# Patient Record
Sex: FEMALE | Race: WHITE | Employment: FULL TIME | URBAN - METROPOLITAN AREA
[De-identification: names, ages, dates, MRNs, and addresses within clinical notes are randomized per-mention and may not be internally consistent; named-entity substitution may affect disease eponyms.]

---

## 2017-02-28 ENCOUNTER — APPOINTMENT (OUTPATIENT)
Dept: LAB | Age: 27
End: 2017-02-28
Attending: OBSTETRICS & GYNECOLOGY
Payer: COMMERCIAL

## 2017-02-28 ENCOUNTER — TELEPHONE (OUTPATIENT)
Dept: OBGYN CLINIC | Facility: CLINIC | Age: 27
End: 2017-02-28

## 2017-02-28 DIAGNOSIS — N91.2 AMENORRHEA: Primary | ICD-10-CM

## 2017-02-28 DIAGNOSIS — N91.2 AMENORRHEA: ICD-10-CM

## 2017-02-28 LAB
HCG QUANTITATIVE: 18 MIU/ML (ref ?–1)
PROGESTERONE: 26.02 NG/ML

## 2017-02-28 PROCEDURE — 84144 ASSAY OF PROGESTERONE: CPT

## 2017-02-28 PROCEDURE — 84702 CHORIONIC GONADOTROPIN TEST: CPT

## 2017-02-28 NOTE — TELEPHONE ENCOUNTER
pt has had period for 3 months and is irregular and just took a positive preg test and wants to know what to do

## 2017-02-28 NOTE — TELEPHONE ENCOUNTER
Patient's last LMP February 1, 2017, irregular menses, positive urine pregnancy test this morning. Order for HCG/progestrone.

## 2017-03-01 ENCOUNTER — TELEPHONE (OUTPATIENT)
Dept: OBGYN CLINIC | Facility: CLINIC | Age: 27
End: 2017-03-01

## 2017-03-01 DIAGNOSIS — N91.2 AMENORRHEA, UNSPECIFIED: Primary | ICD-10-CM

## 2017-03-02 ENCOUNTER — APPOINTMENT (OUTPATIENT)
Dept: LAB | Age: 27
End: 2017-03-02
Attending: OBSTETRICS & GYNECOLOGY
Payer: COMMERCIAL

## 2017-03-02 DIAGNOSIS — N91.2 AMENORRHEA, UNSPECIFIED: ICD-10-CM

## 2017-03-02 LAB — HCG QUANTITATIVE: 55 MIU/ML (ref ?–1)

## 2017-03-02 PROCEDURE — 84702 CHORIONIC GONADOTROPIN TEST: CPT

## 2017-03-02 NOTE — PROGRESS NOTES
Quick Note:    Patient aware, appointments schedule as instructed per MD. Patient instructed to start taking prenatal vitamins  ______

## 2017-03-16 ENCOUNTER — TELEPHONE (OUTPATIENT)
Dept: OBGYN CLINIC | Facility: CLINIC | Age: 27
End: 2017-03-16

## 2017-03-16 NOTE — TELEPHONE ENCOUNTER
Patient called with a c/o abdominal cramping/ no bleeding. Patient is 6 weeks pregnant. Appointment to see Dr. Marixa Jacobs schedule.

## 2017-03-17 ENCOUNTER — LAB ENCOUNTER (OUTPATIENT)
Dept: LAB | Age: 27
End: 2017-03-17
Attending: OBSTETRICS & GYNECOLOGY
Payer: COMMERCIAL

## 2017-03-17 ENCOUNTER — NURSE ONLY (OUTPATIENT)
Dept: OBGYN CLINIC | Facility: CLINIC | Age: 27
End: 2017-03-17

## 2017-03-17 ENCOUNTER — OFFICE VISIT (OUTPATIENT)
Dept: OBGYN CLINIC | Facility: CLINIC | Age: 27
End: 2017-03-17

## 2017-03-17 VITALS
BODY MASS INDEX: 23.74 KG/M2 | SYSTOLIC BLOOD PRESSURE: 118 MMHG | TEMPERATURE: 99 F | DIASTOLIC BLOOD PRESSURE: 60 MMHG | HEART RATE: 96 BPM | HEIGHT: 63 IN | WEIGHT: 134 LBS

## 2017-03-17 DIAGNOSIS — Z3A.01 LESS THAN 8 WEEKS GESTATION OF PREGNANCY: ICD-10-CM

## 2017-03-17 DIAGNOSIS — Z34.81 PRENATAL CARE, SUBSEQUENT PREGNANCY, FIRST TRIMESTER: Primary | ICD-10-CM

## 2017-03-17 LAB
ANTIBODY SCREEN: NEGATIVE
APPEARANCE: CLEAR
BASOPHILS # BLD AUTO: 0.05 X10(3) UL (ref 0–0.1)
BASOPHILS NFR BLD AUTO: 0.7 %
EOSINOPHIL # BLD AUTO: 0.05 X10(3) UL (ref 0–0.3)
EOSINOPHIL NFR BLD AUTO: 0.7 %
ERYTHROCYTE [DISTWIDTH] IN BLOOD BY AUTOMATED COUNT: 12.6 % (ref 11.5–16)
HBV SURFACE AG SERPL QL IA: NONREACTIVE
HCG QUANTITATIVE: ABNORMAL MIU/ML (ref ?–1)
HCT VFR BLD AUTO: 39.6 % (ref 34–50)
HGB BLD-MCNC: 13.5 G/DL (ref 12–16)
IMMATURE GRANULOCYTE COUNT: 0.04 X10(3) UL (ref 0–1)
IMMATURE GRANULOCYTE RATIO %: 0.6 %
LYMPHOCYTES # BLD AUTO: 1.37 X10(3) UL (ref 0.9–4)
LYMPHOCYTES NFR BLD AUTO: 20.1 %
MCH RBC QN AUTO: 31.2 PG (ref 27–33.2)
MCHC RBC AUTO-ENTMCNC: 34.1 G/DL (ref 31–37)
MCV RBC AUTO: 91.5 FL (ref 81–100)
MONOCYTES # BLD AUTO: 0.51 X10(3) UL (ref 0.1–0.6)
MONOCYTES NFR BLD AUTO: 7.5 %
MULTISTIX LOT#: NORMAL NUMERIC
NEUTROPHIL ABS PRELIM: 4.8 X10 (3) UL (ref 1.3–6.7)
NEUTROPHILS # BLD AUTO: 4.8 X10(3) UL (ref 1.3–6.7)
NEUTROPHILS NFR BLD AUTO: 70.4 %
PH, URINE: 5.5 (ref 4.5–8)
PLATELET # BLD AUTO: 280 10(3)UL (ref 150–450)
RBC # BLD AUTO: 4.33 X10(6)UL (ref 3.8–5.1)
RED CELL DISTRIBUTION WIDTH-SD: 41.8 FL (ref 35.1–46.3)
RH BLOOD TYPE: POSITIVE
RUBELLA IGG QUANTITATIVE: 63.6 IU/ML
RUBV IGG SER QL: POSITIVE
SPECIFIC GRAVITY: 1.02 (ref 1–1.03)
T PALLIDUM AB SER QL IA: NONREACTIVE
URINE-COLOR: YELLOW
UROBILINOGEN,SEMI-QN: 0.2 MG/DL (ref 0–1.9)
WBC # BLD AUTO: 6.8 X10(3) UL (ref 4–13)

## 2017-03-17 PROCEDURE — 86901 BLOOD TYPING SEROLOGIC RH(D): CPT

## 2017-03-17 PROCEDURE — 86900 BLOOD TYPING SEROLOGIC ABO: CPT

## 2017-03-17 PROCEDURE — 81002 URINALYSIS NONAUTO W/O SCOPE: CPT | Performed by: OBSTETRICS & GYNECOLOGY

## 2017-03-17 PROCEDURE — 87660 TRICHOMONAS VAGIN DIR PROBE: CPT | Performed by: OBSTETRICS & GYNECOLOGY

## 2017-03-17 PROCEDURE — 87480 CANDIDA DNA DIR PROBE: CPT | Performed by: OBSTETRICS & GYNECOLOGY

## 2017-03-17 PROCEDURE — 87340 HEPATITIS B SURFACE AG IA: CPT

## 2017-03-17 PROCEDURE — 84702 CHORIONIC GONADOTROPIN TEST: CPT

## 2017-03-17 PROCEDURE — 88175 CYTOPATH C/V AUTO FLUID REDO: CPT | Performed by: OBSTETRICS & GYNECOLOGY

## 2017-03-17 PROCEDURE — 87624 HPV HI-RISK TYP POOLED RSLT: CPT | Performed by: OBSTETRICS & GYNECOLOGY

## 2017-03-17 PROCEDURE — 99395 PREV VISIT EST AGE 18-39: CPT | Performed by: OBSTETRICS & GYNECOLOGY

## 2017-03-17 PROCEDURE — 85025 COMPLETE CBC W/AUTO DIFF WBC: CPT

## 2017-03-17 PROCEDURE — 86762 RUBELLA ANTIBODY: CPT

## 2017-03-17 PROCEDURE — 87389 HIV-1 AG W/HIV-1&-2 AB AG IA: CPT

## 2017-03-17 PROCEDURE — 87086 URINE CULTURE/COLONY COUNT: CPT | Performed by: OBSTETRICS & GYNECOLOGY

## 2017-03-17 PROCEDURE — 76801 OB US < 14 WKS SINGLE FETUS: CPT | Performed by: OBSTETRICS & GYNECOLOGY

## 2017-03-17 PROCEDURE — 87510 GARDNER VAG DNA DIR PROBE: CPT | Performed by: OBSTETRICS & GYNECOLOGY

## 2017-03-17 PROCEDURE — 86780 TREPONEMA PALLIDUM: CPT

## 2017-03-17 PROCEDURE — 86850 RBC ANTIBODY SCREEN: CPT

## 2017-03-17 NOTE — PROGRESS NOTES
Marycruz Drummond is a 32year old female. HPI:   Patient presents with:  Prenatal Care: cramping abd area      C/O LOWER ABD PAIN<  WORSE IN AM WITH MOVEMENT. IRREG MENSES NO FEVER>   NO BLEEDING.  NO DYSURIA  OR  VAG SYX>  NO DYSPARUNIA    Medications (A

## 2017-03-19 LAB
C TRACH DNA SPEC QL NAA+PROBE: NEGATIVE
N GONORRHOEA DNA SPEC QL NAA+PROBE: NEGATIVE

## 2017-03-20 ENCOUNTER — TELEPHONE (OUTPATIENT)
Dept: OBGYN CLINIC | Facility: CLINIC | Age: 27
End: 2017-03-20

## 2017-03-20 NOTE — TELEPHONE ENCOUNTER
Per Dr. Florencia Daly patient to come back for [address] and 1st OB visit on 3/22/17.  Patient aware

## 2017-03-20 NOTE — TELEPHONE ENCOUNTER
Pt has questions reg her results that came in, that everything seemed normal but why did she get the pain on her side, and if she still needed her ultrasound appointment for 03-22-17

## 2017-03-21 LAB — HPV I/H RISK 1 DNA SPEC QL NAA+PROBE: NEGATIVE

## 2017-03-22 ENCOUNTER — NURSE ONLY (OUTPATIENT)
Dept: OBGYN CLINIC | Facility: CLINIC | Age: 27
End: 2017-03-22

## 2017-03-22 ENCOUNTER — OFFICE VISIT (OUTPATIENT)
Dept: OBGYN CLINIC | Facility: CLINIC | Age: 27
End: 2017-03-22

## 2017-03-22 VITALS
BODY MASS INDEX: 24.45 KG/M2 | WEIGHT: 138 LBS | DIASTOLIC BLOOD PRESSURE: 60 MMHG | HEIGHT: 63 IN | SYSTOLIC BLOOD PRESSURE: 128 MMHG

## 2017-03-22 DIAGNOSIS — Z3A.01 LESS THAN 8 WEEKS GESTATION OF PREGNANCY: ICD-10-CM

## 2017-03-22 DIAGNOSIS — Z34.81 PRENATAL CARE, SUBSEQUENT PREGNANCY, FIRST TRIMESTER: Primary | ICD-10-CM

## 2017-03-23 DIAGNOSIS — Z34.91 ENCOUNTER FOR PREGNANCY RELATED EXAMINATION IN FIRST TRIMESTER: Primary | ICD-10-CM

## 2017-03-23 PROCEDURE — 76801 OB US < 14 WKS SINGLE FETUS: CPT | Performed by: OBSTETRICS & GYNECOLOGY

## 2017-03-23 PROCEDURE — 76817 TRANSVAGINAL US OBSTETRIC: CPT | Performed by: OBSTETRICS & GYNECOLOGY

## 2017-03-24 ENCOUNTER — TELEPHONE (OUTPATIENT)
Dept: OBGYN CLINIC | Facility: CLINIC | Age: 27
End: 2017-03-24

## 2017-03-24 RX ORDER — DOXYLAMINE SUCCINATE AND PYRIDOXINE HYDROCHLORIDE, DELAYED RELEASE TABLETS 10 MG/10 MG 10; 10 MG/1; MG/1
2 TABLET, DELAYED RELEASE ORAL NIGHTLY
Qty: 120 TABLET | Refills: 1 | Status: SHIPPED | OUTPATIENT
Start: 2017-03-24 | End: 2017-07-28

## 2017-03-24 NOTE — TELEPHONE ENCOUNTER
----- Message from Veronica Gutierrez sent at 3/24/2017 12:42 PM CDT -----  Contact: self  Pt calling back

## 2017-04-19 ENCOUNTER — APPOINTMENT (OUTPATIENT)
Dept: LAB | Age: 27
End: 2017-04-19
Attending: OBSTETRICS & GYNECOLOGY
Payer: COMMERCIAL

## 2017-04-19 ENCOUNTER — OFFICE VISIT (OUTPATIENT)
Dept: OBGYN CLINIC | Facility: CLINIC | Age: 27
End: 2017-04-19

## 2017-04-19 ENCOUNTER — NURSE ONLY (OUTPATIENT)
Dept: OBGYN CLINIC | Facility: CLINIC | Age: 27
End: 2017-04-19

## 2017-04-19 VITALS
SYSTOLIC BLOOD PRESSURE: 102 MMHG | HEIGHT: 63 IN | WEIGHT: 133 LBS | BODY MASS INDEX: 23.57 KG/M2 | DIASTOLIC BLOOD PRESSURE: 60 MMHG

## 2017-04-19 DIAGNOSIS — Z13.79 ENCOUNTER FOR GENETIC SCREENING: ICD-10-CM

## 2017-04-19 DIAGNOSIS — Z3A.10 10 WEEKS GESTATION OF PREGNANCY: ICD-10-CM

## 2017-04-19 DIAGNOSIS — Z34.81 PRENATAL CARE, SUBSEQUENT PREGNANCY, FIRST TRIMESTER: Primary | ICD-10-CM

## 2017-04-19 PROCEDURE — 76801 OB US < 14 WKS SINGLE FETUS: CPT | Performed by: OBSTETRICS & GYNECOLOGY

## 2017-04-19 NOTE — PROGRESS NOTES
C/o nausea, takes diclegis nightly, advised to add dose in a.m.  - N.T.-1.5cm-NL, counsyl today  - AFP at 16-18 weeks

## 2017-04-28 NOTE — PROGRESS NOTES
Patient aware of normal Counsyl results, predicted fetal sex-Female (patient does not want to know the sex at this time)

## 2017-05-02 ENCOUNTER — MED REC SCAN ONLY (OUTPATIENT)
Dept: OBGYN CLINIC | Facility: CLINIC | Age: 27
End: 2017-05-02

## 2017-05-17 ENCOUNTER — OFFICE VISIT (OUTPATIENT)
Dept: OBGYN CLINIC | Facility: CLINIC | Age: 27
End: 2017-05-17

## 2017-05-17 VITALS
SYSTOLIC BLOOD PRESSURE: 110 MMHG | WEIGHT: 132 LBS | HEIGHT: 63 IN | DIASTOLIC BLOOD PRESSURE: 68 MMHG | BODY MASS INDEX: 23.39 KG/M2

## 2017-05-17 DIAGNOSIS — Z3A.14 14 WEEKS GESTATION OF PREGNANCY: ICD-10-CM

## 2017-05-17 DIAGNOSIS — Z34.82 PRENATAL CARE, SUBSEQUENT PREGNANCY, SECOND TRIMESTER: Primary | ICD-10-CM

## 2017-06-14 ENCOUNTER — APPOINTMENT (OUTPATIENT)
Dept: LAB | Age: 27
End: 2017-06-14
Attending: OBSTETRICS & GYNECOLOGY
Payer: COMMERCIAL

## 2017-06-14 ENCOUNTER — OFFICE VISIT (OUTPATIENT)
Dept: OBGYN CLINIC | Facility: CLINIC | Age: 27
End: 2017-06-14

## 2017-06-14 VITALS
WEIGHT: 136 LBS | HEIGHT: 63 IN | DIASTOLIC BLOOD PRESSURE: 72 MMHG | BODY MASS INDEX: 24.1 KG/M2 | SYSTOLIC BLOOD PRESSURE: 112 MMHG

## 2017-06-14 DIAGNOSIS — Z3A.18 18 WEEKS GESTATION OF PREGNANCY: ICD-10-CM

## 2017-06-14 DIAGNOSIS — IMO0002 EVALUATE ANATOMY NOT SEEN ON PRIOR SONOGRAM: ICD-10-CM

## 2017-06-14 DIAGNOSIS — Z34.82 PRENATAL CARE, SUBSEQUENT PREGNANCY, SECOND TRIMESTER: ICD-10-CM

## 2017-06-14 DIAGNOSIS — Z34.82 PRENATAL CARE, SUBSEQUENT PREGNANCY, SECOND TRIMESTER: Primary | ICD-10-CM

## 2017-06-14 DIAGNOSIS — N76.0 VAGINITIS AND VULVOVAGINITIS: ICD-10-CM

## 2017-06-14 PROCEDURE — 87660 TRICHOMONAS VAGIN DIR PROBE: CPT | Performed by: OBSTETRICS & GYNECOLOGY

## 2017-06-14 PROCEDURE — 87510 GARDNER VAG DNA DIR PROBE: CPT | Performed by: OBSTETRICS & GYNECOLOGY

## 2017-06-14 PROCEDURE — 76805 OB US >/= 14 WKS SNGL FETUS: CPT | Performed by: OBSTETRICS & GYNECOLOGY

## 2017-06-14 PROCEDURE — 87480 CANDIDA DNA DIR PROBE: CPT | Performed by: OBSTETRICS & GYNECOLOGY

## 2017-06-14 PROCEDURE — 82105 ALPHA-FETOPROTEIN SERUM: CPT | Performed by: OBSTETRICS & GYNECOLOGY

## 2017-06-27 ENCOUNTER — HOSPITAL ENCOUNTER (EMERGENCY)
Facility: HOSPITAL | Age: 27
Discharge: HOME | End: 2017-06-27
Attending: EMERGENCY MEDICINE
Payer: COMMERCIAL

## 2017-06-27 ENCOUNTER — APPOINTMENT (OUTPATIENT)
Dept: GENERAL RADIOLOGY | Facility: HOSPITAL | Age: 27
End: 2017-06-27
Attending: EMERGENCY MEDICINE
Payer: COMMERCIAL

## 2017-06-27 ENCOUNTER — APPOINTMENT (OUTPATIENT)
Dept: ULTRASOUND IMAGING | Facility: HOSPITAL | Age: 27
End: 2017-06-27
Attending: EMERGENCY MEDICINE
Payer: COMMERCIAL

## 2017-06-27 ENCOUNTER — APPOINTMENT (OUTPATIENT)
Dept: CT IMAGING | Facility: HOSPITAL | Age: 27
End: 2017-06-27
Attending: EMERGENCY MEDICINE
Payer: COMMERCIAL

## 2017-06-27 ENCOUNTER — OFFICE VISIT (OUTPATIENT)
Dept: OBGYN CLINIC | Facility: CLINIC | Age: 27
End: 2017-06-27

## 2017-06-27 ENCOUNTER — TELEPHONE (OUTPATIENT)
Dept: OBGYN CLINIC | Facility: CLINIC | Age: 27
End: 2017-06-27

## 2017-06-27 VITALS
WEIGHT: 140 LBS | TEMPERATURE: 98 F | HEART RATE: 79 BPM | DIASTOLIC BLOOD PRESSURE: 77 MMHG | RESPIRATION RATE: 18 BRPM | BODY MASS INDEX: 25 KG/M2 | OXYGEN SATURATION: 99 % | SYSTOLIC BLOOD PRESSURE: 112 MMHG

## 2017-06-27 VITALS
SYSTOLIC BLOOD PRESSURE: 102 MMHG | WEIGHT: 139 LBS | DIASTOLIC BLOOD PRESSURE: 62 MMHG | HEIGHT: 63 IN | BODY MASS INDEX: 24.63 KG/M2

## 2017-06-27 DIAGNOSIS — Z34.82 PRENATAL CARE, SUBSEQUENT PREGNANCY, SECOND TRIMESTER: Primary | ICD-10-CM

## 2017-06-27 DIAGNOSIS — R07.9 ACUTE CHEST PAIN: Primary | ICD-10-CM

## 2017-06-27 PROCEDURE — 93010 ELECTROCARDIOGRAM REPORT: CPT

## 2017-06-27 PROCEDURE — 93005 ELECTROCARDIOGRAM TRACING: CPT

## 2017-06-27 PROCEDURE — 83880 ASSAY OF NATRIURETIC PEPTIDE: CPT | Performed by: EMERGENCY MEDICINE

## 2017-06-27 PROCEDURE — 71275 CT ANGIOGRAPHY CHEST: CPT | Performed by: EMERGENCY MEDICINE

## 2017-06-27 PROCEDURE — 85025 COMPLETE CBC W/AUTO DIFF WBC: CPT | Performed by: EMERGENCY MEDICINE

## 2017-06-27 PROCEDURE — 99285 EMERGENCY DEPT VISIT HI MDM: CPT

## 2017-06-27 PROCEDURE — 36415 COLL VENOUS BLD VENIPUNCTURE: CPT

## 2017-06-27 PROCEDURE — 80053 COMPREHEN METABOLIC PANEL: CPT | Performed by: EMERGENCY MEDICINE

## 2017-06-27 PROCEDURE — 71010 XR CHEST AP PORTABLE  (CPT=71010): CPT | Performed by: EMERGENCY MEDICINE

## 2017-06-27 PROCEDURE — 84484 ASSAY OF TROPONIN QUANT: CPT | Performed by: EMERGENCY MEDICINE

## 2017-06-27 PROCEDURE — 83690 ASSAY OF LIPASE: CPT | Performed by: EMERGENCY MEDICINE

## 2017-06-27 PROCEDURE — 93970 EXTREMITY STUDY: CPT | Performed by: EMERGENCY MEDICINE

## 2017-06-27 NOTE — ED PROVIDER NOTES
Patient Seen in: [facility] Emergency Department    History   Patient presents with:  Chest Pain Angina (cardiovascular)    Stated Complaint: cp     HPI    Patient is a 77-year-old with a history of childhood asthma who presents for evaluation of ch in HPI.     Physical Exam   ED Triage Vitals [06/27/17 1646]  BP: 118/73  Pulse: 82  Resp: 16  Temp: 97.8 °F (36.6 °C)  Temp src: n/a  SpO2: 100 %  O2 Device: None (Room air)    Current:/65   Pulse 84   Temp 97.8 °F (36.6 °C)   Resp 15   Wt 63.5 kg DRAW BLUE   RAINBOW DRAW LAVENDER   RAINBOW DRAW LIGHT GREEN   RAINBOW DRAW GOLD   RAINBOW DRAW GOLD   RAINBOW DRAW BLUE   RAINBOW DRAW LAVENDER   RAINBOW DRAW LIGHT GREEN     EKG    Rate, intervals and axes as noted on EKG Report.   Rate: 83  Rhythm: Sinus superficial femoral, popliteal, sapheno-femoral junction, and posterior tibial veins. FINDINGS:  Compression is demonstrated of the common femoral, superficial femoral and popliteal venous segments bilaterally.  There is observed phasicity and augmentatio evidence of an emergent condition which require further intervention or hospitalization I do feel she can be discharged home and is strongly encouraged follow-up with her primary care physician. I did speak with Dr. Mary Jose her OB who sent her into the ER.

## 2017-06-27 NOTE — PROGRESS NOTES
u/s views scheduled. Complaining of chest pain. Tightening. Some shortness of breath. History of heartburn does not feel like that. Not take anything for the pain. Chest pain spreads out to the side. No history of DVT or heart problems. Sent to ER.

## 2017-07-12 ENCOUNTER — OFFICE VISIT (OUTPATIENT)
Dept: OBGYN CLINIC | Facility: CLINIC | Age: 27
End: 2017-07-12

## 2017-07-12 VITALS
SYSTOLIC BLOOD PRESSURE: 110 MMHG | DIASTOLIC BLOOD PRESSURE: 58 MMHG | WEIGHT: 139 LBS | BODY MASS INDEX: 24.63 KG/M2 | HEIGHT: 63 IN

## 2017-07-12 DIAGNOSIS — IMO0002 EVALUATE ANATOMY NOT SEEN ON PRIOR SONOGRAM: ICD-10-CM

## 2017-07-12 DIAGNOSIS — Z34.82 PRENATAL CARE, SUBSEQUENT PREGNANCY, SECOND TRIMESTER: Primary | ICD-10-CM

## 2017-07-12 DIAGNOSIS — Z3A.22 22 WEEKS GESTATION OF PREGNANCY: ICD-10-CM

## 2017-07-12 PROCEDURE — 76816 OB US FOLLOW-UP PER FETUS: CPT | Performed by: OBSTETRICS & GYNECOLOGY

## 2017-07-28 RX ORDER — DOXYLAMINE SUCCINATE AND PYRIDOXINE HYDROCHLORIDE 10; 10 MG/1; MG/1
TABLET, DELAYED RELEASE ORAL
Qty: 120 TABLET | Refills: 0 | Status: ON HOLD | OUTPATIENT
Start: 2017-07-28 | End: 2017-11-02

## 2017-08-10 ENCOUNTER — OFFICE VISIT (OUTPATIENT)
Dept: OBGYN CLINIC | Facility: CLINIC | Age: 27
End: 2017-08-10

## 2017-08-10 ENCOUNTER — LAB ENCOUNTER (OUTPATIENT)
Dept: LAB | Age: 27
End: 2017-08-10
Attending: OBSTETRICS & GYNECOLOGY
Payer: COMMERCIAL

## 2017-08-10 VITALS
HEIGHT: 63 IN | SYSTOLIC BLOOD PRESSURE: 100 MMHG | DIASTOLIC BLOOD PRESSURE: 60 MMHG | BODY MASS INDEX: 25.52 KG/M2 | WEIGHT: 144 LBS

## 2017-08-10 DIAGNOSIS — Z34.82 PRENATAL CARE, SUBSEQUENT PREGNANCY, SECOND TRIMESTER: Primary | ICD-10-CM

## 2017-08-10 DIAGNOSIS — Z34.82 PRENATAL CARE, SUBSEQUENT PREGNANCY, SECOND TRIMESTER: ICD-10-CM

## 2017-08-10 LAB
BASOPHILS # BLD AUTO: 0.02 X10(3) UL (ref 0–0.1)
BASOPHILS NFR BLD AUTO: 0.2 %
EOSINOPHIL # BLD AUTO: 0.12 X10(3) UL (ref 0–0.3)
EOSINOPHIL NFR BLD AUTO: 1.4 %
ERYTHROCYTE [DISTWIDTH] IN BLOOD BY AUTOMATED COUNT: 13.2 % (ref 11.5–16)
GLUCOSE 1H P GLC SERPL-MCNC: 101 MG/DL
HCT VFR BLD AUTO: 28 % (ref 34–50)
HGB BLD-MCNC: 8.9 G/DL (ref 12–16)
IMMATURE GRANULOCYTE COUNT: 0.12 X10(3) UL (ref 0–1)
IMMATURE GRANULOCYTE RATIO %: 1.4 %
LYMPHOCYTES # BLD AUTO: 1.69 X10(3) UL (ref 0.9–4)
LYMPHOCYTES NFR BLD AUTO: 19.3 %
MCH RBC QN AUTO: 29.1 PG (ref 27–33.2)
MCHC RBC AUTO-ENTMCNC: 31.8 G/DL (ref 31–37)
MCV RBC AUTO: 91.5 FL (ref 81–100)
MONOCYTES # BLD AUTO: 0.65 X10(3) UL (ref 0.1–0.6)
MONOCYTES NFR BLD AUTO: 7.4 %
NEUTROPHIL ABS PRELIM: 6.14 X10 (3) UL (ref 1.3–6.7)
NEUTROPHILS # BLD AUTO: 6.14 X10(3) UL (ref 1.3–6.7)
NEUTROPHILS NFR BLD AUTO: 70.3 %
PLATELET # BLD AUTO: 262 10(3)UL (ref 150–450)
RBC # BLD AUTO: 3.06 X10(6)UL (ref 3.8–5.1)
RED CELL DISTRIBUTION WIDTH-SD: 42.9 FL (ref 35.1–46.3)
WBC # BLD AUTO: 8.7 X10(3) UL (ref 4–13)

## 2017-08-10 PROCEDURE — 85025 COMPLETE CBC W/AUTO DIFF WBC: CPT | Performed by: OBSTETRICS & GYNECOLOGY

## 2017-08-10 PROCEDURE — 82950 GLUCOSE TEST: CPT | Performed by: OBSTETRICS & GYNECOLOGY

## 2017-08-11 ENCOUNTER — TELEPHONE (OUTPATIENT)
Dept: OBGYN CLINIC | Facility: CLINIC | Age: 27
End: 2017-08-11

## 2017-08-11 DIAGNOSIS — O99.012 ANEMIA IN PREGNANCY, SECOND TRIMESTER: Primary | ICD-10-CM

## 2017-08-11 NOTE — PROGRESS NOTES
Patient aware of results and recommendations. Order placed in Epic for Dr. Celena Alejandra consult and order faxed for iron infusion. Patient verbalizes understanding.

## 2017-08-11 NOTE — TELEPHONE ENCOUNTER
----- Message from Tiarra Davis sent at 8/11/2017 11:00 AM CDT -----  Contact: self  Pt calling back to speak with Henrique Hartmann, pt states that she called dr. Elyssa Lozano office , pt states they only had an appt for next Thursday for an 1 hour consultation and

## 2017-08-15 ENCOUNTER — MED REC SCAN ONLY (OUTPATIENT)
Dept: OBGYN CLINIC | Facility: CLINIC | Age: 27
End: 2017-08-15

## 2017-08-17 ENCOUNTER — OFFICE VISIT (OUTPATIENT)
Dept: HEMATOLOGY/ONCOLOGY | Facility: HOSPITAL | Age: 27
End: 2017-08-17
Attending: INTERNAL MEDICINE
Payer: COMMERCIAL

## 2017-08-17 VITALS
WEIGHT: 141.63 LBS | HEIGHT: 63 IN | BODY MASS INDEX: 25.09 KG/M2 | HEART RATE: 101 BPM | OXYGEN SATURATION: 98 % | DIASTOLIC BLOOD PRESSURE: 73 MMHG | SYSTOLIC BLOOD PRESSURE: 109 MMHG | TEMPERATURE: 98 F | RESPIRATION RATE: 18 BRPM

## 2017-08-17 DIAGNOSIS — O99.019 ANEMIA AFFECTING SECOND PREGNANCY: ICD-10-CM

## 2017-08-17 DIAGNOSIS — D64.9 NORMOCYTIC ANEMIA: Primary | ICD-10-CM

## 2017-08-17 LAB
DEPRECATED HBV CORE AB SER IA-ACNC: 3.7 NG/ML (ref 10–291)
HAV AB SERPL IA-ACNC: 250 PG/ML (ref 193–986)
IRON SATURATION: 8 % (ref 13–45)
IRON: 54 UG/DL (ref 28–170)
TOTAL IRON BINDING CAPACITY: 656 UG/DL (ref 298–536)
TRANSFERRIN: 440 MG/DL (ref 200–360)

## 2017-08-17 PROCEDURE — 99214 OFFICE O/P EST MOD 30 MIN: CPT | Performed by: INTERNAL MEDICINE

## 2017-08-17 RX ORDER — FERROUS SULFATE 325(65) MG
325 TABLET ORAL
Qty: 30 TABLET | Refills: 3 | Status: SHIPPED | OUTPATIENT
Start: 2017-08-17 | End: 2017-10-30

## 2017-08-17 NOTE — CONSULTS
[facility] Report of Consultation    Patient Name: Isa Moreno   YOB: 1990   Medical Record Number: [id number]   CSN: [id number]   Consulting Physician: William Noland MD  Referring Physician(s): No ref.  provider found  Date of Consulta None on file     Social History Narrative   None on file       Allergies:   No Known Allergies    Current Medications:    Current Outpatient Prescriptions:   •  DICLEGIS 10-10 MG Oral Tab EC, TAKE 2 TABLETS BY MOUTH EVERY NIGHT (Patient taking differentl 06/27/2017   CA 9.2 06/27/2017   ALKPHO 44 06/27/2017   ALT 15 06/27/2017   AST 13 (L) 06/27/2017   BILT 0.2 06/27/2017   ALB 2.6 (L) 06/27/2017   TP 6.6 06/27/2017         Impression:    Normocytic anemia in pregnancy:    She has had a decreased hemoglobi

## 2017-08-31 ENCOUNTER — OFFICE VISIT (OUTPATIENT)
Dept: OBGYN CLINIC | Facility: CLINIC | Age: 27
End: 2017-08-31

## 2017-08-31 ENCOUNTER — NURSE ONLY (OUTPATIENT)
Dept: HEMATOLOGY/ONCOLOGY | Facility: HOSPITAL | Age: 27
End: 2017-08-31
Attending: INTERNAL MEDICINE
Payer: COMMERCIAL

## 2017-08-31 VITALS
BODY MASS INDEX: 25.52 KG/M2 | HEIGHT: 63 IN | SYSTOLIC BLOOD PRESSURE: 110 MMHG | DIASTOLIC BLOOD PRESSURE: 58 MMHG | WEIGHT: 144 LBS

## 2017-08-31 DIAGNOSIS — D64.9 NORMOCYTIC ANEMIA: ICD-10-CM

## 2017-08-31 DIAGNOSIS — O99.019 ANEMIA AFFECTING SECOND PREGNANCY: ICD-10-CM

## 2017-08-31 DIAGNOSIS — O99.019 ANEMIA AFFECTING SECOND PREGNANCY: Primary | ICD-10-CM

## 2017-08-31 LAB
BASOPHILS # BLD AUTO: 0.02 X10(3) UL (ref 0–0.1)
BASOPHILS NFR BLD AUTO: 0.2 %
EOSINOPHIL # BLD AUTO: 0.05 X10(3) UL (ref 0–0.3)
EOSINOPHIL NFR BLD AUTO: 0.6 %
ERYTHROCYTE [DISTWIDTH] IN BLOOD BY AUTOMATED COUNT: 15 % (ref 11.5–16)
HCT VFR BLD AUTO: 32.8 % (ref 34–50)
HGB BLD-MCNC: 10.5 G/DL (ref 12–16)
IMMATURE GRANULOCYTE COUNT: 0.1 X10(3) UL (ref 0–1)
IMMATURE GRANULOCYTE RATIO %: 1.1 %
LYMPHOCYTES # BLD AUTO: 1.62 X10(3) UL (ref 0.9–4)
LYMPHOCYTES NFR BLD AUTO: 18.6 %
MCH RBC QN AUTO: 29.2 PG (ref 27–33.2)
MCHC RBC AUTO-ENTMCNC: 32 G/DL (ref 31–37)
MCV RBC AUTO: 91.4 FL (ref 81–100)
MONOCYTES # BLD AUTO: 0.53 X10(3) UL (ref 0.1–0.6)
MONOCYTES NFR BLD AUTO: 6.1 %
NEUTROPHIL ABS PRELIM: 6.4 X10 (3) UL (ref 1.3–6.7)
NEUTROPHILS # BLD AUTO: 6.4 X10(3) UL (ref 1.3–6.7)
NEUTROPHILS NFR BLD AUTO: 73.4 %
PLATELET # BLD AUTO: 219 10(3)UL (ref 150–450)
RBC # BLD AUTO: 3.59 X10(6)UL (ref 3.8–5.1)
RED CELL DISTRIBUTION WIDTH-SD: 49.1 FL (ref 35.1–46.3)
WBC # BLD AUTO: 8.7 X10(3) UL (ref 4–13)

## 2017-08-31 PROCEDURE — 85025 COMPLETE CBC W/AUTO DIFF WBC: CPT

## 2017-08-31 PROCEDURE — 36415 COLL VENOUS BLD VENIPUNCTURE: CPT

## 2017-08-31 NOTE — PROGRESS NOTES
Flying to [address] [place] [address] at 32 weeks. Travel was discussed. Still takes dyclegis. Has heartburn. Is taking iron. Did not get IV iron. Car seat discussed.

## 2017-09-06 DIAGNOSIS — D50.9 IRON DEFICIENCY ANEMIA DURING PREGNANCY: ICD-10-CM

## 2017-09-06 DIAGNOSIS — O99.019 IRON DEFICIENCY ANEMIA DURING PREGNANCY: ICD-10-CM

## 2017-09-06 DIAGNOSIS — O99.019 ANEMIA AFFECTING SECOND PREGNANCY: Primary | ICD-10-CM

## 2017-09-16 ENCOUNTER — OFFICE VISIT (OUTPATIENT)
Dept: OBGYN CLINIC | Facility: CLINIC | Age: 27
End: 2017-09-16

## 2017-09-16 VITALS
WEIGHT: 145 LBS | DIASTOLIC BLOOD PRESSURE: 64 MMHG | BODY MASS INDEX: 25.69 KG/M2 | HEIGHT: 63 IN | SYSTOLIC BLOOD PRESSURE: 110 MMHG

## 2017-09-16 DIAGNOSIS — Z34.83 PRENATAL CARE, SUBSEQUENT PREGNANCY IN THIRD TRIMESTER: Primary | ICD-10-CM

## 2017-09-16 DIAGNOSIS — Z3A.32 32 WEEKS GESTATION OF PREGNANCY: ICD-10-CM

## 2017-09-21 ENCOUNTER — NURSE ONLY (OUTPATIENT)
Dept: HEMATOLOGY/ONCOLOGY | Facility: HOSPITAL | Age: 27
End: 2017-09-21
Attending: INTERNAL MEDICINE
Payer: COMMERCIAL

## 2017-09-21 DIAGNOSIS — O99.019 ANEMIA AFFECTING SECOND PREGNANCY: ICD-10-CM

## 2017-09-21 DIAGNOSIS — O99.019 IRON DEFICIENCY ANEMIA DURING PREGNANCY: ICD-10-CM

## 2017-09-21 DIAGNOSIS — D50.9 IRON DEFICIENCY ANEMIA DURING PREGNANCY: ICD-10-CM

## 2017-09-21 LAB
BASOPHILS # BLD AUTO: 0.04 X10(3) UL (ref 0–0.1)
BASOPHILS NFR BLD AUTO: 0.6 %
DEPRECATED HBV CORE AB SER IA-ACNC: 15.3 NG/ML (ref 10–291)
EOSINOPHIL # BLD AUTO: 0.12 X10(3) UL (ref 0–0.3)
EOSINOPHIL NFR BLD AUTO: 1.7 %
ERYTHROCYTE [DISTWIDTH] IN BLOOD BY AUTOMATED COUNT: 16.9 % (ref 11.5–16)
HCT VFR BLD AUTO: 33.6 % (ref 34–50)
HGB BLD-MCNC: 11.3 G/DL (ref 12–16)
IMMATURE GRANULOCYTE COUNT: 0.13 X10(3) UL (ref 0–1)
IMMATURE GRANULOCYTE RATIO %: 1.8 %
LYMPHOCYTES # BLD AUTO: 1.25 X10(3) UL (ref 0.9–4)
LYMPHOCYTES NFR BLD AUTO: 17.6 %
MCH RBC QN AUTO: 30.7 PG (ref 27–33.2)
MCHC RBC AUTO-ENTMCNC: 33.6 G/DL (ref 31–37)
MCV RBC AUTO: 91.3 FL (ref 81–100)
MONOCYTES # BLD AUTO: 0.47 X10(3) UL (ref 0.1–0.6)
MONOCYTES NFR BLD AUTO: 6.6 %
NEUTROPHIL ABS PRELIM: 5.11 X10 (3) UL (ref 1.3–6.7)
NEUTROPHILS # BLD AUTO: 5.11 X10(3) UL (ref 1.3–6.7)
NEUTROPHILS NFR BLD AUTO: 71.7 %
PLATELET # BLD AUTO: 218 10(3)UL (ref 150–450)
RBC # BLD AUTO: 3.68 X10(6)UL (ref 3.8–5.1)
RED CELL DISTRIBUTION WIDTH-SD: 56.4 FL (ref 35.1–46.3)
WBC # BLD AUTO: 7.1 X10(3) UL (ref 4–13)

## 2017-09-21 PROCEDURE — 82728 ASSAY OF FERRITIN: CPT

## 2017-09-21 PROCEDURE — 85025 COMPLETE CBC W/AUTO DIFF WBC: CPT

## 2017-09-21 PROCEDURE — 36415 COLL VENOUS BLD VENIPUNCTURE: CPT

## 2017-09-26 ENCOUNTER — TELEPHONE (OUTPATIENT)
Dept: HEMATOLOGY/ONCOLOGY | Facility: HOSPITAL | Age: 27
End: 2017-09-26

## 2017-09-27 ENCOUNTER — OFFICE VISIT (OUTPATIENT)
Dept: OBGYN CLINIC | Facility: CLINIC | Age: 27
End: 2017-09-27

## 2017-09-27 VITALS
SYSTOLIC BLOOD PRESSURE: 108 MMHG | WEIGHT: 149 LBS | HEIGHT: 63 IN | DIASTOLIC BLOOD PRESSURE: 60 MMHG | BODY MASS INDEX: 26.4 KG/M2

## 2017-09-27 DIAGNOSIS — Z34.83 PRENATAL CARE, SUBSEQUENT PREGNANCY IN THIRD TRIMESTER: Primary | ICD-10-CM

## 2017-09-27 PROCEDURE — 90715 TDAP VACCINE 7 YRS/> IM: CPT | Performed by: OBSTETRICS & GYNECOLOGY

## 2017-09-27 PROCEDURE — 90471 IMMUNIZATION ADMIN: CPT | Performed by: OBSTETRICS & GYNECOLOGY

## 2017-09-27 NOTE — PROGRESS NOTES
Had some cramping after walk last Sunday resolved. No bleeding. Flu shot was discussed. Tdap will be given. Has done her preadmission form.

## 2017-10-11 ENCOUNTER — OFFICE VISIT (OUTPATIENT)
Dept: OBGYN CLINIC | Facility: CLINIC | Age: 27
End: 2017-10-11

## 2017-10-11 VITALS
WEIGHT: 150 LBS | DIASTOLIC BLOOD PRESSURE: 80 MMHG | SYSTOLIC BLOOD PRESSURE: 108 MMHG | BODY MASS INDEX: 26.58 KG/M2 | HEIGHT: 63 IN

## 2017-10-11 DIAGNOSIS — Z34.83 PRENATAL CARE, SUBSEQUENT PREGNANCY IN THIRD TRIMESTER: Primary | ICD-10-CM

## 2017-10-11 DIAGNOSIS — Z3A.35 35 WEEKS GESTATION OF PREGNANCY: ICD-10-CM

## 2017-10-11 DIAGNOSIS — Z23 NEED FOR VACCINATION: ICD-10-CM

## 2017-10-11 PROCEDURE — 87081 CULTURE SCREEN ONLY: CPT | Performed by: OBSTETRICS & GYNECOLOGY

## 2017-10-11 PROCEDURE — 90471 IMMUNIZATION ADMIN: CPT | Performed by: OBSTETRICS & GYNECOLOGY

## 2017-10-11 PROCEDURE — 87653 STREP B DNA AMP PROBE: CPT | Performed by: OBSTETRICS & GYNECOLOGY

## 2017-10-11 PROCEDURE — 90686 IIV4 VACC NO PRSV 0.5 ML IM: CPT | Performed by: OBSTETRICS & GYNECOLOGY

## 2017-10-19 ENCOUNTER — OFFICE VISIT (OUTPATIENT)
Dept: OBGYN CLINIC | Facility: CLINIC | Age: 27
End: 2017-10-19

## 2017-10-19 VITALS
DIASTOLIC BLOOD PRESSURE: 58 MMHG | BODY MASS INDEX: 26.58 KG/M2 | HEIGHT: 63 IN | SYSTOLIC BLOOD PRESSURE: 116 MMHG | WEIGHT: 150 LBS

## 2017-10-19 DIAGNOSIS — Z34.83 PRENATAL CARE, SUBSEQUENT PREGNANCY IN THIRD TRIMESTER: Primary | ICD-10-CM

## 2017-10-19 NOTE — PROGRESS NOTES
Baby is moving. Rare contractions. Labor and instructions given. Beta strep is negative. Planes of some stiffness of her hands in the morning.

## 2017-10-25 ENCOUNTER — TELEPHONE (OUTPATIENT)
Dept: OBGYN CLINIC | Facility: CLINIC | Age: 27
End: 2017-10-25

## 2017-10-25 NOTE — TELEPHONE ENCOUNTER
Patient states that she has been having increase amount of yellow discharge since this morning. Patient concern that her water bag broke. Patient instructed to go to Labor and Delivery for evaluation. Patient verbalizes understanding.

## 2017-10-26 ENCOUNTER — OFFICE VISIT (OUTPATIENT)
Dept: OBGYN CLINIC | Facility: CLINIC | Age: 27
End: 2017-10-26

## 2017-10-26 VITALS
DIASTOLIC BLOOD PRESSURE: 62 MMHG | BODY MASS INDEX: 27.11 KG/M2 | SYSTOLIC BLOOD PRESSURE: 116 MMHG | HEIGHT: 63 IN | WEIGHT: 153 LBS

## 2017-10-26 DIAGNOSIS — Z34.83 PRENATAL CARE, SUBSEQUENT PREGNANCY IN THIRD TRIMESTER: Primary | ICD-10-CM

## 2017-10-27 ENCOUNTER — HOSPITAL ENCOUNTER (OUTPATIENT)
Facility: HOSPITAL | Age: 27
Setting detail: OBSERVATION
Discharge: HOME | End: 2017-10-27
Attending: OBSTETRICS & GYNECOLOGY | Admitting: OBSTETRICS & GYNECOLOGY
Payer: COMMERCIAL

## 2017-10-27 VITALS
BODY MASS INDEX: 26.58 KG/M2 | TEMPERATURE: 98 F | SYSTOLIC BLOOD PRESSURE: 125 MMHG | RESPIRATION RATE: 16 BRPM | HEIGHT: 63 IN | WEIGHT: 150 LBS | DIASTOLIC BLOOD PRESSURE: 79 MMHG | HEART RATE: 93 BPM

## 2017-10-27 PROBLEM — Z34.90 PREGNANCY (HCC): Status: ACTIVE | Noted: 2017-10-27

## 2017-10-27 PROBLEM — Z34.90 PREGNANCY: Status: ACTIVE | Noted: 2017-10-27

## 2017-10-27 PROCEDURE — 59025 FETAL NON-STRESS TEST: CPT | Performed by: OBSTETRICS & GYNECOLOGY

## 2017-10-27 NOTE — PROGRESS NOTES
Pt is a 32year old female admitted to TRG4/TRG4-A, Patient presents with:  Contractions: pt c/o of regular & intense ucs since 1500     Pt is 38w0d intra-uterine pregnancy. Denies any leaking of fluid. Reports +fetal movement.    History obtained, consents

## 2017-10-28 NOTE — NST
Nonstress Test   Patient: Nacho Silverio    Gestation: 38w0d    NST:       Variability: Moderate           Accelerations: Yes           Decelerations: None            Baseline: 135 BPM           Uterine Irritability: No           Contractions: Irregular

## 2017-10-28 NOTE — PROGRESS NOTES
D/C instructions given to pt and discussed, questions answered. Pt verb understanding and agreeable to POC. Pt refused wheelchair out. Pt escorted off unit by this RN with instructions in hand and  at side.

## 2017-10-30 ENCOUNTER — TELEPHONE (OUTPATIENT)
Dept: OBGYN CLINIC | Facility: CLINIC | Age: 27
End: 2017-10-30

## 2017-10-30 DIAGNOSIS — D64.9 NORMOCYTIC ANEMIA: ICD-10-CM

## 2017-10-30 DIAGNOSIS — O99.019 ANEMIA AFFECTING SECOND PREGNANCY: ICD-10-CM

## 2017-10-30 RX ORDER — FERROUS SULFATE 325(65) MG
TABLET ORAL
Qty: 30 TABLET | Refills: 0 | Status: SHIPPED | OUTPATIENT
Start: 2017-10-30 | End: 2017-11-03

## 2017-11-01 ENCOUNTER — OFFICE VISIT (OUTPATIENT)
Dept: OBGYN CLINIC | Facility: CLINIC | Age: 27
End: 2017-11-01

## 2017-11-01 VITALS
HEIGHT: 63 IN | SYSTOLIC BLOOD PRESSURE: 106 MMHG | WEIGHT: 151 LBS | DIASTOLIC BLOOD PRESSURE: 60 MMHG | BODY MASS INDEX: 26.75 KG/M2

## 2017-11-01 DIAGNOSIS — Z34.83 PRENATAL CARE, SUBSEQUENT PREGNANCY IN THIRD TRIMESTER: Primary | ICD-10-CM

## 2017-11-02 ENCOUNTER — HOSPITAL ENCOUNTER (INPATIENT)
Facility: HOSPITAL | Age: 27
LOS: 1 days | Discharge: HOME | End: 2017-11-03
Attending: OBSTETRICS & GYNECOLOGY | Admitting: OBSTETRICS & GYNECOLOGY
Payer: COMMERCIAL

## 2017-11-02 PROCEDURE — 0HQ9XZZ REPAIR PERINEUM SKIN, EXTERNAL APPROACH: ICD-10-PCS | Performed by: OBSTETRICS & GYNECOLOGY

## 2017-11-02 PROCEDURE — 59400 OBSTETRICAL CARE: CPT | Performed by: OBSTETRICS & GYNECOLOGY

## 2017-11-02 RX ORDER — HYDROCODONE BITARTRATE AND ACETAMINOPHEN 5; 325 MG/1; MG/1
1 TABLET ORAL EVERY 4 HOURS PRN
Status: DISCONTINUED | OUTPATIENT
Start: 2017-11-02 | End: 2017-11-03

## 2017-11-02 RX ORDER — EPHEDRINE SULFATE 50 MG/ML
10 INJECTION, SOLUTION INTRAVENOUS ONCE
Status: COMPLETED | OUTPATIENT
Start: 2017-11-02 | End: 2017-11-02

## 2017-11-02 RX ORDER — DEXTROSE, SODIUM CHLORIDE, SODIUM LACTATE, POTASSIUM CHLORIDE, AND CALCIUM CHLORIDE 5; .6; .31; .03; .02 G/100ML; G/100ML; G/100ML; G/100ML; G/100ML
INJECTION, SOLUTION INTRAVENOUS AS NEEDED
Status: DISCONTINUED | OUTPATIENT
Start: 2017-11-02 | End: 2017-11-03

## 2017-11-02 RX ORDER — SIMETHICONE 80 MG
80 TABLET,CHEWABLE ORAL 3 TIMES DAILY PRN
Status: DISCONTINUED | OUTPATIENT
Start: 2017-11-02 | End: 2017-11-03

## 2017-11-02 RX ORDER — CALCIUM CARBONATE 200(500)MG
1000 TABLET,CHEWABLE ORAL
Status: DISCONTINUED | OUTPATIENT
Start: 2017-11-02 | End: 2017-11-03

## 2017-11-02 RX ORDER — NALBUPHINE HCL 10 MG/ML
2.5 AMPUL (ML) INJECTION
Status: DISCONTINUED | OUTPATIENT
Start: 2017-11-02 | End: 2017-11-03

## 2017-11-02 RX ORDER — IBUPROFEN 600 MG/1
600 TABLET ORAL EVERY 6 HOURS
Status: DISCONTINUED | OUTPATIENT
Start: 2017-11-02 | End: 2017-11-03

## 2017-11-02 RX ORDER — SODIUM CHLORIDE, SODIUM LACTATE, POTASSIUM CHLORIDE, CALCIUM CHLORIDE 600; 310; 30; 20 MG/100ML; MG/100ML; MG/100ML; MG/100ML
INJECTION, SOLUTION INTRAVENOUS CONTINUOUS
Status: DISCONTINUED | OUTPATIENT
Start: 2017-11-02 | End: 2017-11-03

## 2017-11-02 RX ORDER — ACETAMINOPHEN 325 MG/1
650 TABLET ORAL EVERY 4 HOURS PRN
Status: DISCONTINUED | OUTPATIENT
Start: 2017-11-02 | End: 2017-11-03

## 2017-11-02 RX ORDER — DOCUSATE SODIUM 100 MG/1
100 CAPSULE, LIQUID FILLED ORAL
Status: DISCONTINUED | OUTPATIENT
Start: 2017-11-02 | End: 2017-11-03

## 2017-11-02 RX ORDER — TERBUTALINE SULFATE 1 MG/ML
0.25 INJECTION, SOLUTION SUBCUTANEOUS AS NEEDED
Status: DISCONTINUED | OUTPATIENT
Start: 2017-11-02 | End: 2017-11-03

## 2017-11-02 RX ORDER — EPHEDRINE SULFATE 50 MG/ML
5 INJECTION, SOLUTION INTRAVENOUS AS NEEDED
Status: DISCONTINUED | OUTPATIENT
Start: 2017-11-02 | End: 2017-11-03

## 2017-11-02 RX ORDER — TRISODIUM CITRATE DIHYDRATE AND CITRIC ACID MONOHYDRATE 500; 334 MG/5ML; MG/5ML
30 SOLUTION ORAL AS NEEDED
Status: DISCONTINUED | OUTPATIENT
Start: 2017-11-02 | End: 2017-11-03

## 2017-11-02 RX ORDER — BISACODYL 10 MG
10 SUPPOSITORY, RECTAL RECTAL ONCE AS NEEDED
Status: ACTIVE | OUTPATIENT
Start: 2017-11-02 | End: 2017-11-02

## 2017-11-02 RX ORDER — HYDROCODONE BITARTRATE AND ACETAMINOPHEN 5; 325 MG/1; MG/1
2 TABLET ORAL EVERY 4 HOURS PRN
Status: DISCONTINUED | OUTPATIENT
Start: 2017-11-02 | End: 2017-11-03

## 2017-11-02 RX ORDER — IBUPROFEN 600 MG/1
600 TABLET ORAL ONCE AS NEEDED
Status: DISCONTINUED | OUTPATIENT
Start: 2017-11-02 | End: 2017-11-03

## 2017-11-02 RX ORDER — ZOLPIDEM TARTRATE 5 MG/1
5 TABLET ORAL NIGHTLY PRN
Status: DISCONTINUED | OUTPATIENT
Start: 2017-11-02 | End: 2017-11-03

## 2017-11-02 NOTE — PROGRESS NOTES
Pt is a 32year old female admitted to -A. No chief complaint on file. Pt is  38w6d intra-uterine pregnancy. History obtained, consents signed. Oriented to room, staff, and plan of care.

## 2017-11-02 NOTE — PROGRESS NOTES
11/02/17 0858   Clinical Encounter Type   Continue Visiting ( to remain available for continuity of patient care, as needed/requested, at pager [phone].)   Referral To (Referral made to [employer] for [employer], as requested. )   S

## 2017-11-02 NOTE — PROGRESS NOTES
viable female. Mouth and nose suctioned at the perineum. Infant to mothers abd then to warmed radiant warmer.  apgars 8-9

## 2017-11-02 NOTE — PROGRESS NOTES
Report recvd from [facility] pt care. Consents explained and signed. ID bands verified per pt and this RN. POC discussed with pt, pt verb understanding and agreeable to POC.

## 2017-11-02 NOTE — PROGRESS NOTES
Patient presented to triage with c/o leaking and contractions every 2-3 minutes X 90 minutes. ROM + obtained and SVE completed. Patient 5/90/-2. Patient transferred to room 111, report to [place], [facility].

## 2017-11-02 NOTE — H&P
[address] [facility] Patient Status:  Inpatient    1990 MRN [id number]   Location [address] [facility] Attending Colby Mueller MD   Hosp Day # 0 PCP Taylor Marie MD     Subjective:   W1E1 41  admitt

## 2017-11-02 NOTE — L&D DELIVERY NOTE
Nusrat, Girl  [[id number]]     Labor Events     labor?:  No   Rupture date:  17   Rupture time:  0130   Rupture type:  SROM   Fluid color:  Clear   Induction:  None          Labor Event Times    Labor onset date/time:   17 0000 CDT   Nadja 15 Minute:   20 Minute:     Skin color:   Heart rate:   Reflex irritability:   Muscle tone:   Respiratory effort:    Total:                                                                 Apgars assigned by:  Sherren Rasher RN   [place] disposition:  with mother

## 2017-11-03 VITALS
SYSTOLIC BLOOD PRESSURE: 127 MMHG | WEIGHT: 151 LBS | RESPIRATION RATE: 18 BRPM | BODY MASS INDEX: 27.79 KG/M2 | DIASTOLIC BLOOD PRESSURE: 83 MMHG | TEMPERATURE: 98 F | HEIGHT: 62 IN | HEART RATE: 69 BPM | OXYGEN SATURATION: 94 %

## 2017-11-03 NOTE — PLAN OF CARE
POSTPARTUM    • Long Term Goal:Experiences normal postpartum course Completed    • Optimize infant feeding at the breast Completed    • Appropriate maternal -  bonding Completed            Will discuss plan of care for at home with d/c instructions

## 2017-11-03 NOTE — PROGRESS NOTES
[facility]  Post-Partum Progress Note    Uri Jessica Nusrat Patient Status:  Inpatient    1990 MRN [id number]   [facility] 2SW-J Attending Betty Remy MD   Hosp Day # 1 PCP Rosita Espinosa MD     SUBJECTIVE:    Postpartum Day 1:

## 2017-11-06 ENCOUNTER — TELEPHONE (OUTPATIENT)
Dept: OBGYN UNIT | Facility: HOSPITAL | Age: 27
End: 2017-11-06

## 2017-11-08 ENCOUNTER — TELEPHONE (OUTPATIENT)
Dept: OBGYN CLINIC | Facility: CLINIC | Age: 27
End: 2017-11-08

## 2017-11-09 ENCOUNTER — TELEPHONE (OUTPATIENT)
Dept: OBGYN CLINIC | Facility: CLINIC | Age: 27
End: 2017-11-09

## 2017-11-09 NOTE — TELEPHONE ENCOUNTER
PC with patient, paged received to  Since delivery from last week has passed 2 clots. Today was about 3 cm in size. Regarding bleeding, she changes pad every 3 to 4 hours. Some cramping when nurses.  States had picked up toddler and was playing with david

## 2017-12-15 ENCOUNTER — OFFICE VISIT (OUTPATIENT)
Dept: OBGYN CLINIC | Facility: CLINIC | Age: 27
End: 2017-12-15

## 2017-12-15 VITALS
WEIGHT: 131 LBS | DIASTOLIC BLOOD PRESSURE: 72 MMHG | BODY MASS INDEX: 23.21 KG/M2 | HEIGHT: 63 IN | HEART RATE: 72 BPM | RESPIRATION RATE: 16 BRPM | SYSTOLIC BLOOD PRESSURE: 110 MMHG

## 2017-12-15 DIAGNOSIS — D64.9 NORMOCYTIC ANEMIA: Primary | ICD-10-CM

## 2017-12-15 PROBLEM — D50.9 IRON DEFICIENCY ANEMIA DURING PREGNANCY: Status: RESOLVED | Noted: 2017-09-06 | Resolved: 2017-12-15

## 2017-12-15 PROBLEM — D50.9 IRON DEFICIENCY ANEMIA DURING PREGNANCY (HCC): Status: RESOLVED | Noted: 2017-09-06 | Resolved: 2017-12-15

## 2017-12-15 PROBLEM — Z34.90 PREGNANCY: Status: RESOLVED | Noted: 2017-10-27 | Resolved: 2017-12-15

## 2017-12-15 PROBLEM — Z34.90 PREGNANCY (HCC): Status: RESOLVED | Noted: 2017-10-27 | Resolved: 2017-12-15

## 2017-12-15 PROBLEM — O99.019 IRON DEFICIENCY ANEMIA DURING PREGNANCY (HCC): Status: RESOLVED | Noted: 2017-09-06 | Resolved: 2017-12-15

## 2017-12-15 PROBLEM — O99.019 IRON DEFICIENCY ANEMIA DURING PREGNANCY: Status: RESOLVED | Noted: 2017-09-06 | Resolved: 2017-12-15

## 2017-12-15 PROBLEM — O99.019: Status: RESOLVED | Noted: 2017-08-17 | Resolved: 2017-12-15

## 2017-12-15 PROBLEM — O99.019 ANEMIA AFFECTING SECOND PREGNANCY: Status: RESOLVED | Noted: 2017-08-17 | Resolved: 2017-12-15

## 2017-12-15 RX ORDER — ACETAMINOPHEN AND CODEINE PHOSPHATE 120; 12 MG/5ML; MG/5ML
0.35 SOLUTION ORAL DAILY
Qty: 3 PACKAGE | Refills: 4 | Status: SHIPPED | OUTPATIENT
Start: 2017-12-15 | End: 2018-01-12

## 2017-12-15 NOTE — PROGRESS NOTES
MARLEN    Beatriz Silva is a 32year old female  here for 6 week post-partum visit. Patient delivered a  female infant on  via . Patient desires ocp for contraception. Patient is breast feeding.    Patient denies symptoms of depression, Cristi no acute distress  Abdomen:  soft, nontender, no masses  External Genitalia: normal appearance, hair distribution, and no lesions  Vagina:  Normal appearance without lesions, no abnormal discharge  Cervix:  Normal without tenderness on motion without lesio

## 2018-11-26 ENCOUNTER — MED REC SCAN ONLY (OUTPATIENT)
Dept: OBGYN CLINIC | Facility: CLINIC | Age: 28
End: 2018-11-26

## 2019-01-02 ENCOUNTER — OFFICE VISIT (OUTPATIENT)
Dept: OBGYN CLINIC | Facility: CLINIC | Age: 29
End: 2019-01-02
Payer: COMMERCIAL

## 2019-01-02 VITALS
DIASTOLIC BLOOD PRESSURE: 58 MMHG | HEART RATE: 80 BPM | BODY MASS INDEX: 26.22 KG/M2 | HEIGHT: 63 IN | WEIGHT: 148 LBS | SYSTOLIC BLOOD PRESSURE: 114 MMHG

## 2019-01-02 DIAGNOSIS — Z12.39 ENCOUNTER FOR SCREENING BREAST EXAMINATION: ICD-10-CM

## 2019-01-02 DIAGNOSIS — Z31.69 ENCOUNTER FOR PRECONCEPTION CONSULTATION: ICD-10-CM

## 2019-01-02 DIAGNOSIS — Z12.4 SCREENING FOR MALIGNANT NEOPLASM OF CERVIX: ICD-10-CM

## 2019-01-02 DIAGNOSIS — Z01.419 WELL WOMAN EXAM WITH ROUTINE GYNECOLOGICAL EXAM: Primary | ICD-10-CM

## 2019-01-02 PROCEDURE — 88175 CYTOPATH C/V AUTO FLUID REDO: CPT | Performed by: NURSE PRACTITIONER

## 2019-01-02 PROCEDURE — 87624 HPV HI-RISK TYP POOLED RSLT: CPT | Performed by: NURSE PRACTITIONER

## 2019-01-02 PROCEDURE — 99395 PREV VISIT EST AGE 18-39: CPT | Performed by: NURSE PRACTITIONER

## 2019-01-02 NOTE — PROGRESS NOTES
HPI:   Brennen Mathias is a 25year old [facility] who presents for an annual gynecological exam.   Pt and spouse would like to conceive a 3rd child. They have been trying for 1-2 months. Menses: Patient's last menstrual period was 12/21/2018 (exact date). nourished, well developed  SKIN: Normal, no rashes, no acne, no hirsutism, no ulcers  THYROID: No masses, no thyromegaly  BREASTS: Normal inspection, no dominant masses on palpation, no lymphadenopathy  CV: Regular rate and rhythm  LUNGS: Clear to ausculta trying to conceive  RTO 1 year or PRN.      Diagnoses and all orders for this visit:    Well woman exam with routine gynecological exam    Screening for malignant neoplasm of cervix    Encounter for screening breast examination    Encounter for preconceptio

## 2019-01-03 LAB — HPV I/H RISK 1 DNA SPEC QL NAA+PROBE: NEGATIVE

## 2019-03-01 ENCOUNTER — TELEPHONE (OUTPATIENT)
Dept: OBGYN CLINIC | Facility: CLINIC | Age: 29
End: 2019-03-01

## 2019-03-01 RX ORDER — DOXYLAMINE SUCCINATE AND PYRIDOXINE HYDROCHLORIDE, DELAYED RELEASE TABLETS 10 MG/10 MG 10; 10 MG/1; MG/1
2 TABLET, DELAYED RELEASE ORAL NIGHTLY
Qty: 120 TABLET | Refills: 0 | Status: SHIPPED | OUTPATIENT
Start: 2019-03-01 | End: 2019-03-31

## 2019-03-01 NOTE — TELEPHONE ENCOUNTER
----- Message from Cezar Del Rio sent at 3/1/2019  2:00 PM CST -----  Returning [facility] call

## 2019-03-01 NOTE — TELEPHONE ENCOUNTER
Patient is currently pregnant, LMP 1/15/19. Appointment for 1st OB and US already schedule. She is complaining of nausea and vomiting, she has used Diclegis with her previous pregnancies with good relief. She would like a prescription send to her pharmacy.

## 2019-03-04 ENCOUNTER — TELEPHONE (OUTPATIENT)
Dept: OBGYN CLINIC | Facility: CLINIC | Age: 29
End: 2019-03-04

## 2019-03-07 ENCOUNTER — INITIAL PRENATAL (OUTPATIENT)
Dept: OBGYN CLINIC | Facility: CLINIC | Age: 29
End: 2019-03-07
Payer: COMMERCIAL

## 2019-03-07 ENCOUNTER — ULTRASOUND ENCOUNTER (OUTPATIENT)
Dept: OBGYN CLINIC | Facility: CLINIC | Age: 29
End: 2019-03-07
Payer: COMMERCIAL

## 2019-03-07 VITALS
DIASTOLIC BLOOD PRESSURE: 62 MMHG | HEIGHT: 63 IN | SYSTOLIC BLOOD PRESSURE: 112 MMHG | BODY MASS INDEX: 25.69 KG/M2 | WEIGHT: 145 LBS

## 2019-03-07 DIAGNOSIS — Z34.81 PRENATAL CARE, SUBSEQUENT PREGNANCY, FIRST TRIMESTER: Primary | ICD-10-CM

## 2019-03-07 DIAGNOSIS — O36.80X0 PREGNANCY WITH UNCERTAIN FETAL VIABILITY, SINGLE OR UNSPECIFIED FETUS: ICD-10-CM

## 2019-03-07 DIAGNOSIS — Z34.81 PRENATAL CARE, SUBSEQUENT PREGNANCY IN FIRST TRIMESTER: ICD-10-CM

## 2019-03-07 DIAGNOSIS — O36.80X0 PREGNANCY WITH UNCERTAIN FETAL VIABILITY, SINGLE OR UNSPECIFIED FETUS: Primary | ICD-10-CM

## 2019-03-07 LAB
APPEARANCE: CLEAR
MULTISTIX LOT#: NORMAL NUMERIC
PH, URINE: 6 (ref 4.5–8)
SPECIFIC GRAVITY: 1.03 (ref 1–1.03)
URINE-COLOR: YELLOW
UROBILINOGEN,SEMI-QN: 0.2 MG/DL (ref 0–1.9)

## 2019-03-07 PROCEDURE — 87591 N.GONORRHOEAE DNA AMP PROB: CPT | Performed by: OBSTETRICS & GYNECOLOGY

## 2019-03-07 PROCEDURE — 81002 URINALYSIS NONAUTO W/O SCOPE: CPT | Performed by: OBSTETRICS & GYNECOLOGY

## 2019-03-07 PROCEDURE — 87491 CHLMYD TRACH DNA AMP PROBE: CPT | Performed by: OBSTETRICS & GYNECOLOGY

## 2019-03-07 PROCEDURE — 76801 OB US < 14 WKS SINGLE FETUS: CPT | Performed by: OBSTETRICS & GYNECOLOGY

## 2019-03-07 PROCEDURE — 87086 URINE CULTURE/COLONY COUNT: CPT | Performed by: OBSTETRICS & GYNECOLOGY

## 2019-03-07 NOTE — PROGRESS NOTES
Since last Friday she has been having some diffuse abdominal pain. No fever. No throwing up no one around her is sick. It hurts when she puts pressure on it is not constipated.   She had to take diclegis every day for her last pregnancy it is helping geraldine

## 2019-03-08 LAB
C TRACH DNA SPEC QL NAA+PROBE: NEGATIVE
N GONORRHOEA DNA SPEC QL NAA+PROBE: NEGATIVE

## 2019-03-30 DIAGNOSIS — Z36.82 ENCOUNTER FOR ANTENATAL SCREENING FOR NUCHAL TRANSLUCENCY: Primary | ICD-10-CM

## 2019-04-04 ENCOUNTER — APPOINTMENT (OUTPATIENT)
Dept: LAB | Age: 29
End: 2019-04-04
Attending: OBSTETRICS & GYNECOLOGY
Payer: COMMERCIAL

## 2019-04-04 ENCOUNTER — ULTRASOUND ENCOUNTER (OUTPATIENT)
Dept: OBGYN CLINIC | Facility: CLINIC | Age: 29
End: 2019-04-04
Payer: COMMERCIAL

## 2019-04-04 ENCOUNTER — ROUTINE PRENATAL (OUTPATIENT)
Dept: OBGYN CLINIC | Facility: CLINIC | Age: 29
End: 2019-04-04
Payer: COMMERCIAL

## 2019-04-04 VITALS
BODY MASS INDEX: 25.45 KG/M2 | DIASTOLIC BLOOD PRESSURE: 62 MMHG | SYSTOLIC BLOOD PRESSURE: 106 MMHG | WEIGHT: 143.63 LBS | HEIGHT: 63 IN

## 2019-04-04 DIAGNOSIS — Z3A.11 11 WEEKS GESTATION OF PREGNANCY: ICD-10-CM

## 2019-04-04 DIAGNOSIS — Z34.81 PRENATAL CARE, SUBSEQUENT PREGNANCY IN FIRST TRIMESTER: ICD-10-CM

## 2019-04-04 DIAGNOSIS — Z36.82 ENCOUNTER FOR ANTENATAL SCREENING FOR NUCHAL TRANSLUCENCY: Primary | ICD-10-CM

## 2019-04-04 PROCEDURE — 76813 OB US NUCHAL MEAS 1 GEST: CPT | Performed by: OBSTETRICS & GYNECOLOGY

## 2019-04-04 RX ORDER — DOXYLAMINE SUCCINATE AND PYRIDOXINE HYDROCHLORIDE, DELAYED RELEASE TABLETS 10 MG/10 MG 10; 10 MG/1; MG/1
2 TABLET, DELAYED RELEASE ORAL NIGHTLY
Qty: 120 TABLET | Refills: 1 | Status: SHIPPED | OUTPATIENT
Start: 2019-04-04 | End: 2019-11-26 | Stop reason: ALTCHOICE

## 2019-04-10 ENCOUNTER — TELEPHONE (OUTPATIENT)
Dept: OBGYN CLINIC | Facility: CLINIC | Age: 29
End: 2019-04-10

## 2019-04-10 NOTE — TELEPHONE ENCOUNTER
Spoke to pharmacist and Diclegius dose was increased from 2 tabs nightly to 2 tabs in the morning and 2 tabs at night.

## 2019-04-11 ENCOUNTER — TELEPHONE (OUTPATIENT)
Dept: OBGYN CLINIC | Facility: CLINIC | Age: 29
End: 2019-04-11

## 2019-04-11 NOTE — TELEPHONE ENCOUNTER
Patient aware of normal Prequel Prenatal Screen results. She will  results at the office later today.

## 2019-04-12 ENCOUNTER — MED REC SCAN ONLY (OUTPATIENT)
Dept: OBGYN CLINIC | Facility: CLINIC | Age: 29
End: 2019-04-12

## 2019-05-02 ENCOUNTER — ROUTINE PRENATAL (OUTPATIENT)
Dept: OBGYN CLINIC | Facility: CLINIC | Age: 29
End: 2019-05-02
Payer: COMMERCIAL

## 2019-05-02 ENCOUNTER — LAB ENCOUNTER (OUTPATIENT)
Dept: LAB | Age: 29
End: 2019-05-02
Attending: OBSTETRICS & GYNECOLOGY
Payer: COMMERCIAL

## 2019-05-02 VITALS
WEIGHT: 144.63 LBS | DIASTOLIC BLOOD PRESSURE: 60 MMHG | SYSTOLIC BLOOD PRESSURE: 100 MMHG | HEIGHT: 63 IN | BODY MASS INDEX: 25.62 KG/M2

## 2019-05-02 DIAGNOSIS — Z34.82 PRENATAL CARE, SUBSEQUENT PREGNANCY IN SECOND TRIMESTER: ICD-10-CM

## 2019-05-02 DIAGNOSIS — Z3A.15 15 WEEKS GESTATION OF PREGNANCY: ICD-10-CM

## 2019-05-02 DIAGNOSIS — Z34.82 PRENATAL CARE, SUBSEQUENT PREGNANCY IN SECOND TRIMESTER: Primary | ICD-10-CM

## 2019-05-02 DIAGNOSIS — Z36.89 SCREENING, ANTENATAL, FOR FETAL ANATOMIC SURVEY: ICD-10-CM

## 2019-05-02 PROBLEM — Z31.69 ENCOUNTER FOR PRECONCEPTION CONSULTATION: Status: RESOLVED | Noted: 2019-01-02 | Resolved: 2019-05-02

## 2019-05-02 PROCEDURE — 86900 BLOOD TYPING SEROLOGIC ABO: CPT | Performed by: OBSTETRICS & GYNECOLOGY

## 2019-05-02 PROCEDURE — 86780 TREPONEMA PALLIDUM: CPT | Performed by: OBSTETRICS & GYNECOLOGY

## 2019-05-02 PROCEDURE — 87340 HEPATITIS B SURFACE AG IA: CPT | Performed by: OBSTETRICS & GYNECOLOGY

## 2019-05-02 PROCEDURE — 87389 HIV-1 AG W/HIV-1&-2 AB AG IA: CPT | Performed by: OBSTETRICS & GYNECOLOGY

## 2019-05-02 PROCEDURE — 86762 RUBELLA ANTIBODY: CPT | Performed by: OBSTETRICS & GYNECOLOGY

## 2019-05-02 PROCEDURE — 85025 COMPLETE CBC W/AUTO DIFF WBC: CPT | Performed by: OBSTETRICS & GYNECOLOGY

## 2019-05-02 PROCEDURE — 86850 RBC ANTIBODY SCREEN: CPT | Performed by: OBSTETRICS & GYNECOLOGY

## 2019-05-02 PROCEDURE — 86901 BLOOD TYPING SEROLOGIC RH(D): CPT | Performed by: OBSTETRICS & GYNECOLOGY

## 2019-06-07 ENCOUNTER — ULTRASOUND ENCOUNTER (OUTPATIENT)
Dept: OBGYN CLINIC | Facility: CLINIC | Age: 29
End: 2019-06-07
Payer: COMMERCIAL

## 2019-06-07 ENCOUNTER — ROUTINE PRENATAL (OUTPATIENT)
Dept: OBGYN CLINIC | Facility: CLINIC | Age: 29
End: 2019-06-07
Payer: COMMERCIAL

## 2019-06-07 VITALS
SYSTOLIC BLOOD PRESSURE: 110 MMHG | HEIGHT: 63 IN | WEIGHT: 145 LBS | DIASTOLIC BLOOD PRESSURE: 50 MMHG | BODY MASS INDEX: 25.69 KG/M2

## 2019-06-07 DIAGNOSIS — Z34.82 PRENATAL CARE, SUBSEQUENT PREGNANCY IN SECOND TRIMESTER: ICD-10-CM

## 2019-06-07 DIAGNOSIS — Z36.89 SCREENING, ANTENATAL, FOR FETAL ANATOMIC SURVEY: ICD-10-CM

## 2019-06-07 PROCEDURE — 76805 OB US >/= 14 WKS SNGL FETUS: CPT | Performed by: OBSTETRICS & GYNECOLOGY

## 2019-07-02 ENCOUNTER — TELEPHONE (OUTPATIENT)
Dept: OBGYN CLINIC | Facility: CLINIC | Age: 29
End: 2019-07-02

## 2019-07-02 ENCOUNTER — LAB ENCOUNTER (OUTPATIENT)
Dept: LAB | Age: 29
End: 2019-07-02
Attending: OBSTETRICS & GYNECOLOGY
Payer: COMMERCIAL

## 2019-07-02 ENCOUNTER — ROUTINE PRENATAL (OUTPATIENT)
Dept: OBGYN CLINIC | Facility: CLINIC | Age: 29
End: 2019-07-02
Payer: COMMERCIAL

## 2019-07-02 VITALS
DIASTOLIC BLOOD PRESSURE: 60 MMHG | BODY MASS INDEX: 26.58 KG/M2 | WEIGHT: 150 LBS | SYSTOLIC BLOOD PRESSURE: 102 MMHG | HEIGHT: 63 IN

## 2019-07-02 DIAGNOSIS — Z34.82 PRENATAL CARE, SUBSEQUENT PREGNANCY IN SECOND TRIMESTER: ICD-10-CM

## 2019-07-02 DIAGNOSIS — Z3A.24 24 WEEKS GESTATION OF PREGNANCY: ICD-10-CM

## 2019-07-02 DIAGNOSIS — Z34.82 PRENATAL CARE, SUBSEQUENT PREGNANCY IN SECOND TRIMESTER: Primary | ICD-10-CM

## 2019-07-02 LAB
BASOPHILS # BLD AUTO: 0.03 X10(3) UL (ref 0–0.2)
BASOPHILS NFR BLD AUTO: 0.3 %
DEPRECATED RDW RBC AUTO: 46.3 FL (ref 35.1–46.3)
EOSINOPHIL # BLD AUTO: 0.1 X10(3) UL (ref 0–0.7)
EOSINOPHIL NFR BLD AUTO: 1.1 %
ERYTHROCYTE [DISTWIDTH] IN BLOOD BY AUTOMATED COUNT: 13.5 % (ref 11–15)
GLUCOSE 1H P GLC SERPL-MCNC: 78 MG/DL
HCT VFR BLD AUTO: 35.4 % (ref 35–48)
HGB BLD-MCNC: 11.9 G/DL (ref 12–16)
IMM GRANULOCYTES # BLD AUTO: 0.08 X10(3) UL (ref 0–1)
IMM GRANULOCYTES NFR BLD: 0.9 %
LYMPHOCYTES # BLD AUTO: 2.35 X10(3) UL (ref 1–4)
LYMPHOCYTES NFR BLD AUTO: 25.9 %
MCH RBC QN AUTO: 31.9 PG (ref 26–34)
MCHC RBC AUTO-ENTMCNC: 33.6 G/DL (ref 31–37)
MCV RBC AUTO: 94.9 FL (ref 80–100)
MONOCYTES # BLD AUTO: 0.69 X10(3) UL (ref 0.1–1)
MONOCYTES NFR BLD AUTO: 7.6 %
NEUTROPHILS # BLD AUTO: 5.83 X10 (3) UL (ref 1.5–7.7)
NEUTROPHILS # BLD AUTO: 5.83 X10(3) UL (ref 1.5–7.7)
NEUTROPHILS NFR BLD AUTO: 64.2 %
PLATELET # BLD AUTO: 259 10(3)UL (ref 150–450)
RBC # BLD AUTO: 3.73 X10(6)UL (ref 3.8–5.3)
WBC # BLD AUTO: 9.1 X10(3) UL (ref 4–11)

## 2019-07-02 PROCEDURE — 82950 GLUCOSE TEST: CPT | Performed by: OBSTETRICS & GYNECOLOGY

## 2019-07-02 PROCEDURE — 85025 COMPLETE CBC W/AUTO DIFF WBC: CPT | Performed by: OBSTETRICS & GYNECOLOGY

## 2019-07-02 NOTE — TELEPHONE ENCOUNTER
[facility] papers has been been completed & signed. faxed to [facility] @ [phone]. fax was confirmed.

## 2019-07-30 ENCOUNTER — ROUTINE PRENATAL (OUTPATIENT)
Dept: OBGYN CLINIC | Facility: CLINIC | Age: 29
End: 2019-07-30
Payer: COMMERCIAL

## 2019-07-30 ENCOUNTER — LAB ENCOUNTER (OUTPATIENT)
Dept: LAB | Age: 29
End: 2019-07-30
Attending: OBSTETRICS & GYNECOLOGY
Payer: COMMERCIAL

## 2019-07-30 VITALS
BODY MASS INDEX: 27.29 KG/M2 | SYSTOLIC BLOOD PRESSURE: 116 MMHG | WEIGHT: 154 LBS | HEIGHT: 63 IN | DIASTOLIC BLOOD PRESSURE: 60 MMHG

## 2019-07-30 DIAGNOSIS — Z34.82 PRENATAL CARE, SUBSEQUENT PREGNANCY, SECOND TRIMESTER: Primary | ICD-10-CM

## 2019-07-30 DIAGNOSIS — Z23 NEED FOR VACCINATION: ICD-10-CM

## 2019-07-30 DIAGNOSIS — Z34.82 PRENATAL CARE, SUBSEQUENT PREGNANCY, SECOND TRIMESTER: ICD-10-CM

## 2019-07-30 DIAGNOSIS — Z3A.28 28 WEEKS GESTATION OF PREGNANCY: ICD-10-CM

## 2019-07-30 LAB — MULTISTIX LOT#: NORMAL NUMERIC

## 2019-07-30 PROCEDURE — 90471 IMMUNIZATION ADMIN: CPT | Performed by: OBSTETRICS & GYNECOLOGY

## 2019-07-30 PROCEDURE — 87389 HIV-1 AG W/HIV-1&-2 AB AG IA: CPT | Performed by: OBSTETRICS & GYNECOLOGY

## 2019-07-30 PROCEDURE — 81002 URINALYSIS NONAUTO W/O SCOPE: CPT | Performed by: OBSTETRICS & GYNECOLOGY

## 2019-07-30 PROCEDURE — 90715 TDAP VACCINE 7 YRS/> IM: CPT | Performed by: OBSTETRICS & GYNECOLOGY

## 2019-08-08 ENCOUNTER — TELEPHONE (OUTPATIENT)
Dept: OBGYN CLINIC | Facility: CLINIC | Age: 29
End: 2019-08-08

## 2019-08-13 ENCOUNTER — ROUTINE PRENATAL (OUTPATIENT)
Dept: OBGYN CLINIC | Facility: CLINIC | Age: 29
End: 2019-08-13
Payer: COMMERCIAL

## 2019-08-13 VITALS
HEIGHT: 63 IN | SYSTOLIC BLOOD PRESSURE: 112 MMHG | DIASTOLIC BLOOD PRESSURE: 70 MMHG | WEIGHT: 154 LBS | BODY MASS INDEX: 27.29 KG/M2 | HEART RATE: 88 BPM

## 2019-08-13 DIAGNOSIS — O26.893 PREGNANCY RELATED HIP PAIN IN THIRD TRIMESTER, ANTEPARTUM: Primary | ICD-10-CM

## 2019-08-13 DIAGNOSIS — O26.893 HEARTBURN DURING PREGNANCY IN THIRD TRIMESTER: ICD-10-CM

## 2019-08-13 DIAGNOSIS — R12 HEARTBURN DURING PREGNANCY IN THIRD TRIMESTER: ICD-10-CM

## 2019-08-13 DIAGNOSIS — Z34.83 PRENATAL CARE, SUBSEQUENT PREGNANCY IN THIRD TRIMESTER: ICD-10-CM

## 2019-08-13 DIAGNOSIS — M25.559 PREGNANCY RELATED HIP PAIN IN THIRD TRIMESTER, ANTEPARTUM: Primary | ICD-10-CM

## 2019-08-13 DIAGNOSIS — O99.891 DISORDER OF MUSCULOSKELETAL SYSTEM DURING PREGNANCY: ICD-10-CM

## 2019-08-13 LAB
GLUCOSE (URINE DIPSTICK): NEGATIVE MG/DL
MULTISTIX LOT#: NORMAL NUMERIC
PROTEIN (URINE DIPSTICK): NEGATIVE MG/DL

## 2019-08-13 PROCEDURE — 81002 URINALYSIS NONAUTO W/O SCOPE: CPT | Performed by: OBSTETRICS & GYNECOLOGY

## 2019-08-13 RX ORDER — FAMOTIDINE 20 MG/1
20 TABLET ORAL 2 TIMES DAILY
Qty: 60 TABLET | Refills: 2 | Status: SHIPPED | OUTPATIENT
Start: 2019-08-13 | End: 2019-11-26 | Stop reason: ALTCHOICE

## 2019-08-13 NOTE — PROGRESS NOTES
STEPHANIE  Doing well, +FM  Denies LOF/VB/uctx   No HA, vision changes. Bilateral hip pains are still persistent. Gets sore if sits too long or walks too long. Appetite hasn't been great last few weeks.  Noticing increasing severity of heartburn that Tums isn

## 2019-08-15 ENCOUNTER — TELEPHONE (OUTPATIENT)
Dept: OBGYN CLINIC | Facility: CLINIC | Age: 29
End: 2019-08-15

## 2019-08-15 NOTE — TELEPHONE ENCOUNTER
Patients  called to say he needs his wife Sada Corona ( pregnant ) to apply lotion from his dermatologist  to his back. He  is wondering if it is safe for her to do as she is pregnant.  . Instructed to have his wife use a disposable glove to apply the lot

## 2019-08-26 ENCOUNTER — OFFICE VISIT (OUTPATIENT)
Dept: PHYSICAL THERAPY | Age: 29
End: 2019-08-26
Attending: OBSTETRICS & GYNECOLOGY
Payer: COMMERCIAL

## 2019-08-26 DIAGNOSIS — O26.893 PREGNANCY RELATED HIP PAIN IN THIRD TRIMESTER, ANTEPARTUM: ICD-10-CM

## 2019-08-26 DIAGNOSIS — O99.891 DISORDER OF MUSCULOSKELETAL SYSTEM DURING PREGNANCY: ICD-10-CM

## 2019-08-26 DIAGNOSIS — M25.559 PREGNANCY RELATED HIP PAIN IN THIRD TRIMESTER, ANTEPARTUM: ICD-10-CM

## 2019-08-26 DIAGNOSIS — O26.893 HEARTBURN DURING PREGNANCY IN THIRD TRIMESTER: ICD-10-CM

## 2019-08-26 DIAGNOSIS — R12 HEARTBURN DURING PREGNANCY IN THIRD TRIMESTER: ICD-10-CM

## 2019-08-26 PROCEDURE — 97112 NEUROMUSCULAR REEDUCATION: CPT

## 2019-08-26 PROCEDURE — 97140 MANUAL THERAPY 1/> REGIONS: CPT

## 2019-08-26 PROCEDURE — 97163 PT EVAL HIGH COMPLEX 45 MIN: CPT

## 2019-08-26 PROCEDURE — 97535 SELF CARE MNGMENT TRAINING: CPT

## 2019-08-26 NOTE — PROGRESS NOTES
MUSCULOSKELETAL AND PELVIC FLOOR EVALUATION:     Referring Physician: Dr. Anderson Sweeney  Diagnosis: Pregnancy related hip pain in third trimester, antepartum (O26.893,M25.559)  Disorder of musculoskeletal system during pregnancy (O26.899,O99.350)  Heartburn d Ms. Briseyda Jaime  is a 30year old y/o female, 32 weeks gestation, who presents to therapy today with complaints of LBP and B hip 3-4/10 pain. Pain increases with sitting >10' and walking> 10'. Patient reports heart burn at night and is taking Tums nightly.   In Additional Info/ Findings: Patient exhibits 4/5 Transverse Abdominis strength, STRs of L> R  With L on R sacral torsion from [facility] Tissue Restrictions; and  Normal DTRs,    Today's treatment: Pregnancy orthopedic evaluation.   Recommendat Frequency / Duration: Patient will be seen for 1-2 x/week or a total of 12 visits over a 90 day period.   Treatment will include: Treatment will include: Neuromuscular re-education, including use of biofeedback to aid in pelvic floor muscle retraining (down X___________________________________________________ Date____________________    Certification From: 5/04/1054  To:11/24/2019       COORDINATING THE PELVIC FLOOR AND BREATHING DIAPHRAGM      Learning to coordinate and contract the pelvic floor with exhalat

## 2019-08-27 ENCOUNTER — ROUTINE PRENATAL (OUTPATIENT)
Dept: OBGYN CLINIC | Facility: CLINIC | Age: 29
End: 2019-08-27
Payer: COMMERCIAL

## 2019-08-27 VITALS
WEIGHT: 156 LBS | HEIGHT: 63 IN | DIASTOLIC BLOOD PRESSURE: 66 MMHG | SYSTOLIC BLOOD PRESSURE: 104 MMHG | BODY MASS INDEX: 27.64 KG/M2

## 2019-08-27 DIAGNOSIS — M25.559 PREGNANCY RELATED HIP PAIN IN THIRD TRIMESTER, ANTEPARTUM: Primary | ICD-10-CM

## 2019-08-27 DIAGNOSIS — O26.893 PREGNANCY RELATED HIP PAIN IN THIRD TRIMESTER, ANTEPARTUM: Primary | ICD-10-CM

## 2019-08-27 DIAGNOSIS — R12 HEARTBURN DURING PREGNANCY IN THIRD TRIMESTER: ICD-10-CM

## 2019-08-27 DIAGNOSIS — Z34.83 PRENATAL CARE, SUBSEQUENT PREGNANCY IN THIRD TRIMESTER: ICD-10-CM

## 2019-08-27 DIAGNOSIS — O99.891 DISORDER OF MUSCULOSKELETAL SYSTEM DURING PREGNANCY: ICD-10-CM

## 2019-08-27 DIAGNOSIS — O26.893 HEARTBURN DURING PREGNANCY IN THIRD TRIMESTER: ICD-10-CM

## 2019-08-27 LAB — MULTISTIX LOT#: NORMAL NUMERIC

## 2019-08-27 PROCEDURE — 81002 URINALYSIS NONAUTO W/O SCOPE: CPT | Performed by: OBSTETRICS & GYNECOLOGY

## 2019-08-27 NOTE — PROGRESS NOTES
STEPHANIE  Doing well, +FM  Denies LOF/VB/uctx   No HA, vision changes. Bilateral hip pains are better. Heartburn - Pepcid is helping. Only taking it at night. Still taking Diclegis.      30year old [id number] at 32w0d    BHARGAV 10/22/19 by LMP c/w 7 wk US  Male

## 2019-08-28 ENCOUNTER — OFFICE VISIT (OUTPATIENT)
Dept: PHYSICAL THERAPY | Age: 29
End: 2019-08-28
Attending: OBSTETRICS & GYNECOLOGY
Payer: COMMERCIAL

## 2019-08-28 DIAGNOSIS — R12 HEARTBURN DURING PREGNANCY IN THIRD TRIMESTER: ICD-10-CM

## 2019-08-28 DIAGNOSIS — M25.559 PREGNANCY RELATED HIP PAIN IN THIRD TRIMESTER, ANTEPARTUM: ICD-10-CM

## 2019-08-28 DIAGNOSIS — O26.893 HEARTBURN DURING PREGNANCY IN THIRD TRIMESTER: ICD-10-CM

## 2019-08-28 DIAGNOSIS — O26.893 PREGNANCY RELATED HIP PAIN IN THIRD TRIMESTER, ANTEPARTUM: ICD-10-CM

## 2019-08-28 DIAGNOSIS — O99.891 DISORDER OF MUSCULOSKELETAL SYSTEM DURING PREGNANCY: ICD-10-CM

## 2019-08-28 PROCEDURE — 97112 NEUROMUSCULAR REEDUCATION: CPT

## 2019-08-28 PROCEDURE — 97140 MANUAL THERAPY 1/> REGIONS: CPT

## 2019-08-28 PROCEDURE — 97535 SELF CARE MNGMENT TRAINING: CPT

## 2019-08-28 NOTE — PROGRESS NOTES
Dx: Pregnancy related hip pain in third trimester, antepartum (O26.893,M25.559)  Disorder of musculoskeletal system during pregnancy (O26.899,O99.350)  Heartburn during pregnancy in third trimester (O26.893,R12)         Insurance (Authorized # of Visits): Blanca Lopez would benefit from skilled Pelvic Physical Therapy for Myofascial Release to Low Back/ Gluteals/ Ligaments; Muscle Energy Techniques to Correct Sacral Iliac Torsion;  Instruction on Coordination of Diaphragm tic Breathing then Pelvic Muscle brace with Plan: Patient will be seen for 1-2 x/week or a total of 12 visits over a 90 day period.  Treatment will include: Treatment will include: Neuromuscular re-education, including use of biofeedback to aid in pelvic floor muscle retraining (down training, up tr

## 2019-09-04 ENCOUNTER — MED REC SCAN ONLY (OUTPATIENT)
Dept: OBGYN CLINIC | Facility: CLINIC | Age: 29
End: 2019-09-04

## 2019-09-04 ENCOUNTER — TELEPHONE (OUTPATIENT)
Dept: OBGYN CLINIC | Facility: CLINIC | Age: 29
End: 2019-09-04

## 2019-09-04 ENCOUNTER — OFFICE VISIT (OUTPATIENT)
Dept: PHYSICAL THERAPY | Age: 29
End: 2019-09-04
Attending: OBSTETRICS & GYNECOLOGY
Payer: COMMERCIAL

## 2019-09-04 ENCOUNTER — HOSPITAL ENCOUNTER (OUTPATIENT)
Facility: HOSPITAL | Age: 29
Setting detail: OBSERVATION
Discharge: HOME | End: 2019-09-04
Attending: OBSTETRICS & GYNECOLOGY | Admitting: OBSTETRICS & GYNECOLOGY
Payer: COMMERCIAL

## 2019-09-04 ENCOUNTER — ROUTINE PRENATAL (OUTPATIENT)
Dept: OBGYN CLINIC | Facility: CLINIC | Age: 29
End: 2019-09-04
Payer: COMMERCIAL

## 2019-09-04 VITALS
BODY MASS INDEX: 28.17 KG/M2 | WEIGHT: 159 LBS | HEIGHT: 62.99 IN | SYSTOLIC BLOOD PRESSURE: 123 MMHG | HEART RATE: 100 BPM | TEMPERATURE: 98 F | DIASTOLIC BLOOD PRESSURE: 75 MMHG | RESPIRATION RATE: 18 BRPM

## 2019-09-04 VITALS
WEIGHT: 159 LBS | SYSTOLIC BLOOD PRESSURE: 116 MMHG | DIASTOLIC BLOOD PRESSURE: 80 MMHG | HEIGHT: 63 IN | BODY MASS INDEX: 28.17 KG/M2

## 2019-09-04 DIAGNOSIS — O47.03 PRETERM UTERINE CONTRACTIONS IN THIRD TRIMESTER, ANTEPARTUM: Primary | ICD-10-CM

## 2019-09-04 DIAGNOSIS — O99.891 DISORDER OF MUSCULOSKELETAL SYSTEM DURING PREGNANCY: ICD-10-CM

## 2019-09-04 DIAGNOSIS — O26.893 PREGNANCY RELATED HIP PAIN IN THIRD TRIMESTER, ANTEPARTUM: ICD-10-CM

## 2019-09-04 DIAGNOSIS — O26.893 HEARTBURN DURING PREGNANCY IN THIRD TRIMESTER: ICD-10-CM

## 2019-09-04 DIAGNOSIS — R12 HEARTBURN DURING PREGNANCY IN THIRD TRIMESTER: ICD-10-CM

## 2019-09-04 DIAGNOSIS — M25.559 PREGNANCY RELATED HIP PAIN IN THIRD TRIMESTER, ANTEPARTUM: ICD-10-CM

## 2019-09-04 PROBLEM — Z34.90 PREGNANCY: Status: ACTIVE | Noted: 2019-09-04

## 2019-09-04 PROBLEM — Z34.90 PREGNANCY (HCC): Status: ACTIVE | Noted: 2019-09-04

## 2019-09-04 LAB
APPEARANCE: CLEAR
BILIRUB UR QL STRIP.AUTO: NEGATIVE
BILIRUBIN: NEGATIVE
COLOR UR AUTO: YELLOW
FETAL FIBRINECTIN: POSITIVE
GLUCOSE (URINE DIPSTICK): NEGATIVE MG/DL
GLUCOSE (URINE DIPSTICK): NEGATIVE MG/DL
GLUCOSE UR STRIP.AUTO-MCNC: NEGATIVE MG/DL
KETONES (URINE DIPSTICK): NEGATIVE MG/DL
LEUKOCYTE ESTERASE UR QL STRIP.AUTO: NEGATIVE
LEUKOCYTES: NEGATIVE
MULTISTIX LOT#: NORMAL NUMERIC
MULTISTIX LOT#: NORMAL NUMERIC
NITRITE UR QL STRIP.AUTO: NEGATIVE
NITRITE, URINE: NEGATIVE
OCCULT BLOOD: NEGATIVE
PH UR STRIP.AUTO: 7 [PH] (ref 4.5–8)
PH, URINE: 7 (ref 4.5–8)
PROT UR STRIP.AUTO-MCNC: NEGATIVE MG/DL
PROTEIN (URINE DIPSTICK): 30 MG/DL
RBC UR QL AUTO: NEGATIVE
SP GR UR STRIP.AUTO: 1.02 (ref 1–1.03)
SPECIFIC GRAVITY: 1.02 (ref 1–1.03)
URINE-COLOR: YELLOW
UROBILINOGEN UR STRIP.AUTO-MCNC: <2 MG/DL
UROBILINOGEN,SEMI-QN: 0.2 MG/DL (ref 0–1.9)

## 2019-09-04 PROCEDURE — 59025 FETAL NON-STRESS TEST: CPT | Performed by: OBSTETRICS & GYNECOLOGY

## 2019-09-04 PROCEDURE — 97140 MANUAL THERAPY 1/> REGIONS: CPT

## 2019-09-04 PROCEDURE — 99213 OFFICE O/P EST LOW 20 MIN: CPT | Performed by: OBSTETRICS & GYNECOLOGY

## 2019-09-04 PROCEDURE — 97112 NEUROMUSCULAR REEDUCATION: CPT

## 2019-09-04 PROCEDURE — 81002 URINALYSIS NONAUTO W/O SCOPE: CPT | Performed by: OBSTETRICS & GYNECOLOGY

## 2019-09-04 PROCEDURE — 87086 URINE CULTURE/COLONY COUNT: CPT | Performed by: OBSTETRICS & GYNECOLOGY

## 2019-09-04 PROCEDURE — 81003 URINALYSIS AUTO W/O SCOPE: CPT | Performed by: OBSTETRICS & GYNECOLOGY

## 2019-09-04 PROCEDURE — 97535 SELF CARE MNGMENT TRAINING: CPT

## 2019-09-04 NOTE — PROGRESS NOTES
Dx: Pregnancy related hip pain in third trimester, antepartum (O26.893,M25.559)  Disorder of musculoskeletal system during pregnancy (O26.899,O99.350)  Heartburn during pregnancy in third trimester (O26.893,R12)         Insurance (Authorized # of Visits): (Upon Pregnancy Orthopedic evaluation, patient exhibits 4/5 Transverse Abdominis strength;  L on R  sacral torsion from Gluteal/ Ligamental Soft Tissue Restrictions; and  Normal Deep Tendon Reflexes.   Performed Myofascial release in side lying then Muscle Plan: Patient will be seen for 1-2 x/week or a total of 12 visits over a 90 day period.  Treatment will include: Treatment will include: Neuromuscular re-education, including use of biofeedback to aid in pelvic floor muscle retraining (down training, up tr Neuromuscular reeducation:  Instruction on DB and PFM/ lower abdominal pelvic muscle brace with transitions and hip accessory musculature, Modified Yoga/ Pilate's   Manual: MFR to ST/ SS/ Gluteals then MET for R on L sacral torsion, Inguinal/ Round Demetrius Alikarla

## 2019-09-04 NOTE — PROGRESS NOTES
Problem Visit in Pregnancy    CC Cramping      HPI 30year old [id number] at 33w1d c/o abdominal cramping throughout the day today. Doesn't feel exactly consistent but feels stronger than usual & has felt it a little lower than usual in her abdomen.      Contr mouth 2 (two) times daily. Disp: 60 tablet Rfl: 2   doxylamine-pyridoxine (DICLEGIS) 10-10 MG Oral Tab EC Take 2 tablets by mouth nightly. Disp: 120 tablet Rfl: 1   Prenatal Vit-Fe Sulfate-FA (PRENATAL VITAMIN OR) Take by mouth.  Disp:  Rfl:      Social His is alert and oriented to person, place, and time. Skin: Skin is warm and dry. Psychiatric: Her mood appears anxious. NST  130 bpm, moderate variability, only 10 x 10 accels present, no decels  [place] irregular  Not reactive.      Diagnoses and all ord

## 2019-09-04 NOTE — PROGRESS NOTES
Pt is a 30year old female admitted to TRG4/TRG4-A. Patient presents with:  R/o  Labor     Pt is [id number] 33w1d intra-uterine pregnancy. History obtained, consents signed. Oriented to room, staff, and plan of care.     Pt was sent from office, pt r

## 2019-09-04 NOTE — TELEPHONE ENCOUNTER
Patient states she has been heaving increased irregular contractions today. Patient denies vaginal bleeding or leaking of fluid, positive fetal movement. Patient to office for evaluation. Patient verbalizes understanding.

## 2019-09-04 NOTE — NST
Nonstress Test   Patient: Verónica Silverio    Gestation: 33w1d    NST:       Variability: Moderate           Accelerations: Yes           Decelerations: None            Baseline: 130 BPM           Uterine Irritability: No           Contractions: Not present

## 2019-09-06 ENCOUNTER — TELEPHONE (OUTPATIENT)
Dept: OBGYN CLINIC | Facility: CLINIC | Age: 29
End: 2019-09-06

## 2019-09-06 NOTE — TELEPHONE ENCOUNTER
Pt was in on 09/04 pt states that she was told to call if she was still having contractions/cramping and she states she still is and wanted to give an update and find out what she should do.  Offered appt for today pt declined and wanted to speak with nurse

## 2019-09-06 NOTE — TELEPHONE ENCOUNTER
Patient states that she was calling to give an update as she was told to do. She is still having uterine contractions about 1 an hour but nothing else has changed, no leaking of fluid, no vaginal discharge or bleeding.  Has increased the amount of water she

## 2019-09-08 PROBLEM — O47.03 PRETERM UTERINE CONTRACTIONS IN THIRD TRIMESTER, ANTEPARTUM (HCC): Status: ACTIVE | Noted: 2019-09-08

## 2019-09-08 PROBLEM — O47.03 PRETERM UTERINE CONTRACTIONS IN THIRD TRIMESTER, ANTEPARTUM: Status: ACTIVE | Noted: 2019-09-08

## 2019-09-08 PROBLEM — O47.03 PRETERM UTERINE CONTRACTIONS IN THIRD TRIMESTER, ANTEPARTUM: Status: ACTIVE | Noted: 2019-09-04

## 2019-09-08 PROBLEM — O47.03 PRETERM UTERINE CONTRACTIONS IN THIRD TRIMESTER, ANTEPARTUM (HCC): Status: ACTIVE | Noted: 2019-09-04

## 2019-09-09 ENCOUNTER — TELEPHONE (OUTPATIENT)
Dept: PHYSICAL THERAPY | Age: 29
End: 2019-09-09

## 2019-09-09 ENCOUNTER — TELEPHONE (OUTPATIENT)
Dept: OBGYN CLINIC | Facility: CLINIC | Age: 29
End: 2019-09-09

## 2019-09-10 ENCOUNTER — ROUTINE PRENATAL (OUTPATIENT)
Dept: OBGYN CLINIC | Facility: CLINIC | Age: 29
End: 2019-09-10
Payer: COMMERCIAL

## 2019-09-10 VITALS
WEIGHT: 159 LBS | DIASTOLIC BLOOD PRESSURE: 68 MMHG | SYSTOLIC BLOOD PRESSURE: 108 MMHG | HEIGHT: 63 IN | BODY MASS INDEX: 28.17 KG/M2

## 2019-09-10 DIAGNOSIS — M25.559 PREGNANCY RELATED HIP PAIN IN THIRD TRIMESTER, ANTEPARTUM: ICD-10-CM

## 2019-09-10 DIAGNOSIS — O99.891 DISORDER OF MUSCULOSKELETAL SYSTEM DURING PREGNANCY: ICD-10-CM

## 2019-09-10 DIAGNOSIS — Z34.83 PRENATAL CARE, SUBSEQUENT PREGNANCY IN THIRD TRIMESTER: Primary | ICD-10-CM

## 2019-09-10 DIAGNOSIS — R12 HEARTBURN DURING PREGNANCY IN THIRD TRIMESTER: ICD-10-CM

## 2019-09-10 DIAGNOSIS — O26.893 PREGNANCY RELATED HIP PAIN IN THIRD TRIMESTER, ANTEPARTUM: ICD-10-CM

## 2019-09-10 DIAGNOSIS — O26.893 HEARTBURN DURING PREGNANCY IN THIRD TRIMESTER: ICD-10-CM

## 2019-09-10 DIAGNOSIS — O47.03 PRETERM UTERINE CONTRACTIONS IN THIRD TRIMESTER, ANTEPARTUM: ICD-10-CM

## 2019-09-10 LAB — MULTISTIX LOT#: NORMAL NUMERIC

## 2019-09-10 PROCEDURE — 81002 URINALYSIS NONAUTO W/O SCOPE: CPT | Performed by: OBSTETRICS & GYNECOLOGY

## 2019-09-10 NOTE — PROGRESS NOTES
STEPHANIE visit    +FM  Denies vaginal bleeding, leaking fluid, contractions. Some mucus discharge. Over the weekend had contractions around every 45 minutes or so. Sometimes wraps around the low back & in the lower belly. Worse with activity.    Trying to drin

## 2019-09-11 ENCOUNTER — OFFICE VISIT (OUTPATIENT)
Dept: PHYSICAL THERAPY | Age: 29
End: 2019-09-11
Attending: OBSTETRICS & GYNECOLOGY
Payer: COMMERCIAL

## 2019-09-11 DIAGNOSIS — M25.559 PREGNANCY RELATED HIP PAIN IN THIRD TRIMESTER, ANTEPARTUM: ICD-10-CM

## 2019-09-11 DIAGNOSIS — R12 HEARTBURN DURING PREGNANCY IN THIRD TRIMESTER: ICD-10-CM

## 2019-09-11 DIAGNOSIS — O26.893 PREGNANCY RELATED HIP PAIN IN THIRD TRIMESTER, ANTEPARTUM: ICD-10-CM

## 2019-09-11 DIAGNOSIS — O99.891 DISORDER OF MUSCULOSKELETAL SYSTEM DURING PREGNANCY: ICD-10-CM

## 2019-09-11 DIAGNOSIS — O26.893 HEARTBURN DURING PREGNANCY IN THIRD TRIMESTER: ICD-10-CM

## 2019-09-11 PROCEDURE — 97112 NEUROMUSCULAR REEDUCATION: CPT

## 2019-09-11 PROCEDURE — 97535 SELF CARE MNGMENT TRAINING: CPT

## 2019-09-11 PROCEDURE — 97110 THERAPEUTIC EXERCISES: CPT

## 2019-09-11 PROCEDURE — 97140 MANUAL THERAPY 1/> REGIONS: CPT

## 2019-09-11 NOTE — PROGRESS NOTES
Dx: Pregnancy related hip pain in third trimester, antepartum (O26.893,M25.559)  Disorder of musculoskeletal system during pregnancy (O26.899,O99.350)  Heartburn during pregnancy in third trimester (O26.893,R12)         Insurance (Authorized # of Visits): (Upon Pregnancy Orthopedic evaluation, patient exhibits 4/5 Transverse Abdominis strength;  L on R  sacral torsion from Gluteal/ Ligamental Soft Tissue Restrictions; and  Normal Deep Tendon Reflexes.   Performed Myofascial release in side lying then Muscle Plan: Patient will be seen for 1-2 x/week or a total of 12 visits over a 90 day period.  Treatment will include: Treatment will include: Neuromuscular re-education, including use of biofeedback to aid in pelvic floor muscle retraining (down training, up tr Gluteals then MET for R on L sacral torsion    Pelvic brace with Lifting/ bending to prevent torsion Inguinal/ Round Ligament release/ scoop L>R  CTR to LAQ  IH/ II n. release nguinal/ Round Ligament release/ scoop L>R  CTR to LAQ  IH/ II n. release     HE

## 2019-09-16 ENCOUNTER — OFFICE VISIT (OUTPATIENT)
Dept: PHYSICAL THERAPY | Age: 29
End: 2019-09-16
Attending: OBSTETRICS & GYNECOLOGY
Payer: COMMERCIAL

## 2019-09-16 DIAGNOSIS — O99.891 DISORDER OF MUSCULOSKELETAL SYSTEM DURING PREGNANCY: ICD-10-CM

## 2019-09-16 DIAGNOSIS — M25.559 PREGNANCY RELATED HIP PAIN IN THIRD TRIMESTER, ANTEPARTUM: ICD-10-CM

## 2019-09-16 DIAGNOSIS — R12 HEARTBURN DURING PREGNANCY IN THIRD TRIMESTER: ICD-10-CM

## 2019-09-16 DIAGNOSIS — O26.893 PREGNANCY RELATED HIP PAIN IN THIRD TRIMESTER, ANTEPARTUM: ICD-10-CM

## 2019-09-16 DIAGNOSIS — O26.893 HEARTBURN DURING PREGNANCY IN THIRD TRIMESTER: ICD-10-CM

## 2019-09-16 PROCEDURE — 97535 SELF CARE MNGMENT TRAINING: CPT

## 2019-09-16 PROCEDURE — 97112 NEUROMUSCULAR REEDUCATION: CPT

## 2019-09-16 PROCEDURE — 97110 THERAPEUTIC EXERCISES: CPT

## 2019-09-16 NOTE — PROGRESS NOTES
Dx: Pregnancy related hip pain in third trimester, antepartum (O26.893,M25.559)  Disorder of musculoskeletal system during pregnancy (O26.899,O99.350)  Heartburn during pregnancy in third trimester (O26.893,R12)         Insurance (Authorized # of Visits): (Upon Pregnancy Orthopedic evaluation, patient exhibits 4/5 Transverse Abdominis strength;  L on R  sacral torsion from Gluteal/ Ligamental Soft Tissue Restrictions; and  Normal Deep Tendon Reflexes.   Performed Myofascial release in side lying then Muscle Plan: Patient will be seen for 1-2 x/week or a total of 12 visits over a 90 day period.  Treatment will include: Treatment will include: Neuromuscular re-education, including use of biofeedback to aid in pelvic floor muscle retraining (down training, up tr Seated DB then Pelvic brace 10 count  5' cycle:  Pelvic brace  ADD ball  ABD Blue T-band   Pelvic brace with lifting/ pivot Seated SB Pelvic brace with Pilate's Ring 10 count  5' cycle:  ADD   ABD  SH ER Green t-band  Hip Circles x20 clock/ counter clock

## 2019-09-17 ENCOUNTER — ROUTINE PRENATAL (OUTPATIENT)
Dept: OBGYN CLINIC | Facility: CLINIC | Age: 29
End: 2019-09-17
Payer: COMMERCIAL

## 2019-09-17 VITALS
SYSTOLIC BLOOD PRESSURE: 116 MMHG | HEIGHT: 63 IN | BODY MASS INDEX: 28.17 KG/M2 | WEIGHT: 159 LBS | DIASTOLIC BLOOD PRESSURE: 70 MMHG

## 2019-09-17 DIAGNOSIS — O26.893 PREGNANCY RELATED HIP PAIN IN THIRD TRIMESTER, ANTEPARTUM: ICD-10-CM

## 2019-09-17 DIAGNOSIS — O47.03 PRETERM UTERINE CONTRACTIONS IN THIRD TRIMESTER, ANTEPARTUM: ICD-10-CM

## 2019-09-17 DIAGNOSIS — M25.559 PREGNANCY RELATED HIP PAIN IN THIRD TRIMESTER, ANTEPARTUM: ICD-10-CM

## 2019-09-17 DIAGNOSIS — Z34.83 PRENATAL CARE, SUBSEQUENT PREGNANCY, THIRD TRIMESTER: Primary | ICD-10-CM

## 2019-09-17 LAB — MULTISTIX LOT#: NORMAL NUMERIC

## 2019-09-17 PROCEDURE — 87081 CULTURE SCREEN ONLY: CPT | Performed by: OBSTETRICS & GYNECOLOGY

## 2019-09-17 PROCEDURE — 81002 URINALYSIS NONAUTO W/O SCOPE: CPT | Performed by: OBSTETRICS & GYNECOLOGY

## 2019-09-17 PROCEDURE — 87653 STREP B DNA AMP PROBE: CPT | Performed by: OBSTETRICS & GYNECOLOGY

## 2019-09-17 NOTE — PROGRESS NOTES
STEPHANIE visit    +FM  Denies vaginal bleeding, leaking fluid. +More discharge than last week. +Contractions sometimes 5-7 minutes apart & then nothing for about 1-2 hours. About 50% are painful & the other half are mild. Irregular.    Denies HA, vision change

## 2019-09-18 ENCOUNTER — OFFICE VISIT (OUTPATIENT)
Dept: PHYSICAL THERAPY | Age: 29
End: 2019-09-18
Attending: OBSTETRICS & GYNECOLOGY
Payer: COMMERCIAL

## 2019-09-18 DIAGNOSIS — M25.559 PREGNANCY RELATED HIP PAIN IN THIRD TRIMESTER, ANTEPARTUM: ICD-10-CM

## 2019-09-18 DIAGNOSIS — O26.893 PREGNANCY RELATED HIP PAIN IN THIRD TRIMESTER, ANTEPARTUM: ICD-10-CM

## 2019-09-18 DIAGNOSIS — R12 HEARTBURN DURING PREGNANCY IN THIRD TRIMESTER: ICD-10-CM

## 2019-09-18 DIAGNOSIS — O99.891 DISORDER OF MUSCULOSKELETAL SYSTEM DURING PREGNANCY: ICD-10-CM

## 2019-09-18 DIAGNOSIS — O26.893 HEARTBURN DURING PREGNANCY IN THIRD TRIMESTER: ICD-10-CM

## 2019-09-18 PROCEDURE — 97112 NEUROMUSCULAR REEDUCATION: CPT

## 2019-09-18 PROCEDURE — 97535 SELF CARE MNGMENT TRAINING: CPT

## 2019-09-18 PROCEDURE — 97110 THERAPEUTIC EXERCISES: CPT

## 2019-09-18 NOTE — PROGRESS NOTES
Dx: Pregnancy related hip pain in third trimester, antepartum (O26.893,M25.559)  Disorder of musculoskeletal system during pregnancy (O26.899,O99.350)  Heartburn during pregnancy in third trimester (O26.893,R12)         Insurance (Authorized # of Visits): (Upon Pregnancy Orthopedic evaluation, patient exhibits 4/5 Transverse Abdominis strength;  L on R  sacral torsion from Gluteal/ Ligamental Soft Tissue Restrictions; and  Normal Deep Tendon Reflexes.   Performed Myofascial release in side lying then Muscle Plan: Patient will be seen for 1-2 x/week or a total of 12 visits over a 90 day period.  Treatment will include: Treatment will include: Neuromuscular re-education, including use of biofeedback to aid in pelvic floor muscle retraining (down training, up tr ADD lunge     II BAR 10 DB Yoga modified:  HS with Cat/Cow  ADD lunge  SB Chest opener 5 DB   Seated DB then Pelvic brace 10 count  5' cycle:  Pelvic brace  ADD ball  ABD Blue T-band    Seated DB then Pelvic brace 10 count  5' cycle:  Pelvic brace  ADD bal Neuromuscular reeducation:  Instruction on DB and PFM/ lower abdominal pelvic muscle brace with transitions and hip accessory musculature, Modified Yoga/ Pilate's, seated SB core stabilization, Scap Stabilization  Manual: MFR to ST/ SS/ Gluteals then MET f The pelvic brace contraction creates a feeling of deep tension and drawing in of the lower abdomen and pelvic floor. ? Your spine (low back) should be in neutral position. Your therapist will help you find your neutral position. ?  Always perform the ex ? Place a lightweight object of __lbs. on a table or the floor. ? Place your feet shoulder width apart and keep your back straight. ? Perform the pelvic brace, bend your knees to reach the object and lift. ? Repeat ___ times. Walking  ?  Stand str

## 2019-09-19 ENCOUNTER — ROUTINE PRENATAL (OUTPATIENT)
Dept: OBGYN CLINIC | Facility: CLINIC | Age: 29
End: 2019-09-19
Payer: COMMERCIAL

## 2019-09-19 ENCOUNTER — TELEPHONE (OUTPATIENT)
Dept: OBGYN CLINIC | Facility: CLINIC | Age: 29
End: 2019-09-19

## 2019-09-19 ENCOUNTER — HOSPITAL ENCOUNTER (OUTPATIENT)
Facility: HOSPITAL | Age: 29
Setting detail: OBSERVATION
Discharge: HOME | End: 2019-09-19
Attending: OBSTETRICS & GYNECOLOGY | Admitting: OBSTETRICS & GYNECOLOGY
Payer: COMMERCIAL

## 2019-09-19 VITALS
HEART RATE: 97 BPM | WEIGHT: 160 LBS | HEIGHT: 63 IN | TEMPERATURE: 99 F | SYSTOLIC BLOOD PRESSURE: 124 MMHG | BODY MASS INDEX: 28.35 KG/M2 | DIASTOLIC BLOOD PRESSURE: 78 MMHG

## 2019-09-19 VITALS
WEIGHT: 161 LBS | HEIGHT: 63 IN | DIASTOLIC BLOOD PRESSURE: 62 MMHG | SYSTOLIC BLOOD PRESSURE: 102 MMHG | BODY MASS INDEX: 28.53 KG/M2

## 2019-09-19 DIAGNOSIS — Z3A.35 35 WEEKS GESTATION OF PREGNANCY: ICD-10-CM

## 2019-09-19 DIAGNOSIS — Z34.93 ENCOUNTER FOR SUPERVISION OF NORMAL PREGNANCY IN THIRD TRIMESTER, UNSPECIFIED GRAVIDITY: Primary | ICD-10-CM

## 2019-09-19 DIAGNOSIS — O36.8190 DECREASED FETAL MOVEMENT AFFECTING MANAGEMENT OF PREGNANCY, ANTEPARTUM, SINGLE OR UNSPECIFIED FETUS: ICD-10-CM

## 2019-09-19 PROBLEM — Z34.90 NORMAL PREGNANCY (HCC): Status: ACTIVE | Noted: 2019-09-19

## 2019-09-19 PROBLEM — Z22.330 GBS CARRIER: Status: ACTIVE | Noted: 2019-09-19

## 2019-09-19 PROBLEM — Z34.90 NORMAL PREGNANCY: Status: ACTIVE | Noted: 2019-09-19

## 2019-09-19 PROCEDURE — 99213 OFFICE O/P EST LOW 20 MIN: CPT | Performed by: OBSTETRICS & GYNECOLOGY

## 2019-09-19 PROCEDURE — 59025 FETAL NON-STRESS TEST: CPT | Performed by: OBSTETRICS & GYNECOLOGY

## 2019-09-19 PROCEDURE — 81002 URINALYSIS NONAUTO W/O SCOPE: CPT | Performed by: OBSTETRICS & GYNECOLOGY

## 2019-09-19 NOTE — PROGRESS NOTES
Patient c/o diarrhea this morning, mild cramping 10-15 min and dizziness  - NST reactive  - not in labor, increase water intake

## 2019-09-19 NOTE — TELEPHONE ENCOUNTER
Pt called because she was experiencing some leakage (clear without odor). When she went for a walk after she also felt she was still leaking some fluid. A little bit of cramping.  Per dr Cirilo Collins instructions and recommendations pt was told to go to the [facility]

## 2019-09-19 NOTE — TELEPHONE ENCOUNTER
Patient calling and is 35 weeks pregnant, she states she has been consistant cramping that is from 12 to 15 mins apart pt also states that she had diahrrea this morning and feels light headed and dizzy. Pt not sure if she should come in or what to do.  Renee

## 2019-09-19 NOTE — TELEPHONE ENCOUNTER
Patient states she has been having abdominal cramping, diarrhea and dizziness. She also c/o decreased fetal movement and increased vaginal discharge. To office for evaluation.

## 2019-09-20 NOTE — NST
Nonstress Test   Patient: Selvin Silverio    Gestation: 35w2d    NST:                                                                                                        Nonstress Test Interpretation: Reactive                                  Additional

## 2019-09-23 ENCOUNTER — OFFICE VISIT (OUTPATIENT)
Dept: PHYSICAL THERAPY | Age: 29
End: 2019-09-23
Attending: OBSTETRICS & GYNECOLOGY
Payer: COMMERCIAL

## 2019-09-23 DIAGNOSIS — M25.559 PREGNANCY RELATED HIP PAIN IN THIRD TRIMESTER, ANTEPARTUM: ICD-10-CM

## 2019-09-23 DIAGNOSIS — O26.893 PREGNANCY RELATED HIP PAIN IN THIRD TRIMESTER, ANTEPARTUM: ICD-10-CM

## 2019-09-23 DIAGNOSIS — O99.891 DISORDER OF MUSCULOSKELETAL SYSTEM DURING PREGNANCY: ICD-10-CM

## 2019-09-23 DIAGNOSIS — O26.893 HEARTBURN DURING PREGNANCY IN THIRD TRIMESTER: ICD-10-CM

## 2019-09-23 DIAGNOSIS — R12 HEARTBURN DURING PREGNANCY IN THIRD TRIMESTER: ICD-10-CM

## 2019-09-23 PROCEDURE — 97535 SELF CARE MNGMENT TRAINING: CPT

## 2019-09-23 PROCEDURE — 97112 NEUROMUSCULAR REEDUCATION: CPT

## 2019-09-23 PROCEDURE — 97140 MANUAL THERAPY 1/> REGIONS: CPT

## 2019-09-23 PROCEDURE — 97110 THERAPEUTIC EXERCISES: CPT

## 2019-09-23 NOTE — PROGRESS NOTES
Dx: Pregnancy related hip pain in third trimester, antepartum (O26.893,M25.559)  Disorder of musculoskeletal system during pregnancy (O26.899,O99.350)  Heartburn during pregnancy in third trimester (O26.893,R12)         Insurance (Authorized # of Visits): Patient will obtain new RX. 6 weeks post partum. (Upon Pregnancy Orthopedic evaluation, patient exhibits 4/5 Transverse Abdominis strength;  L on R  sacral torsion from Gluteal/ Ligamental Soft Tissue Restrictions; and  Normal Deep Tendon Reflexes. Plan: Patient will be seen for 1-2 x/week or a total of 12 visits over a 90 day period.  Treatment will include: Treatment will include: Neuromuscular re-education, including use of biofeedback to aid in pelvic floor muscle retraining (down training, up tr Seated SB Pelvic brace with Pilate's Ring 10 count  5' cycle:  ADD   ABD  SH ER Green t-band  Hip Circles x20 clock/ counter clock wise Seated SB Pelvic brace with Pilate's Ring 10 count  5' cycle:  ADD hip  ABD hip  SH ADD  SH ABD  Hip Circles x20 clock/ Manual: MFR to ST/ SS/ Gluteals then MET for R on L sacral torsion, Inguinal/ Round Ligament release L>R, CTR to LAQ  IH/ II n.  Release  Therapeutic Exercise: TM Pelvic brace crab walking    Charges: 1TE, 1SC, 1NM, 1MT       Total Timed Treatment: 60 min ? Always perform the exercise as instructed by your therapist.   ? The correct contraction creates a hollowing of the lower belly to the bikini line. ? Do not strain, bear down or bulge your abdomen as you do the exercise.     PERFORMING THE PELVIC BRACE Walking  ? Stand straight and upright with a neutral spine. ? Do the pelvic brace while you walk during the day or practice walking in place for ____ minutes.     Other activities:  ______________________________________________________________________

## 2019-09-24 ENCOUNTER — ROUTINE PRENATAL (OUTPATIENT)
Dept: OBGYN CLINIC | Facility: CLINIC | Age: 29
End: 2019-09-24
Payer: COMMERCIAL

## 2019-09-24 VITALS
BODY MASS INDEX: 28.88 KG/M2 | WEIGHT: 163 LBS | DIASTOLIC BLOOD PRESSURE: 62 MMHG | HEIGHT: 63 IN | SYSTOLIC BLOOD PRESSURE: 110 MMHG

## 2019-09-24 DIAGNOSIS — Z34.83 PRENATAL CARE, SUBSEQUENT PREGNANCY, THIRD TRIMESTER: Primary | ICD-10-CM

## 2019-09-24 LAB — MULTISTIX LOT#: NORMAL NUMERIC

## 2019-09-24 PROCEDURE — 81002 URINALYSIS NONAUTO W/O SCOPE: CPT | Performed by: OBSTETRICS & GYNECOLOGY

## 2019-09-25 ENCOUNTER — OFFICE VISIT (OUTPATIENT)
Dept: PHYSICAL THERAPY | Age: 29
End: 2019-09-25
Attending: OBSTETRICS & GYNECOLOGY
Payer: COMMERCIAL

## 2019-09-25 DIAGNOSIS — O99.891 DISORDER OF MUSCULOSKELETAL SYSTEM DURING PREGNANCY: ICD-10-CM

## 2019-09-25 DIAGNOSIS — O26.893 HEARTBURN DURING PREGNANCY IN THIRD TRIMESTER: ICD-10-CM

## 2019-09-25 DIAGNOSIS — R12 HEARTBURN DURING PREGNANCY IN THIRD TRIMESTER: ICD-10-CM

## 2019-09-25 DIAGNOSIS — M25.559 PREGNANCY RELATED HIP PAIN IN THIRD TRIMESTER, ANTEPARTUM: ICD-10-CM

## 2019-09-25 DIAGNOSIS — O26.893 PREGNANCY RELATED HIP PAIN IN THIRD TRIMESTER, ANTEPARTUM: ICD-10-CM

## 2019-09-25 PROCEDURE — 97112 NEUROMUSCULAR REEDUCATION: CPT

## 2019-09-25 PROCEDURE — 97535 SELF CARE MNGMENT TRAINING: CPT

## 2019-09-25 PROCEDURE — 97140 MANUAL THERAPY 1/> REGIONS: CPT

## 2019-09-25 PROCEDURE — 97110 THERAPEUTIC EXERCISES: CPT

## 2019-09-25 NOTE — PROGRESS NOTES
Dx: Pregnancy related hip pain in third trimester, antepartum (O26.893,M25.559)  Disorder of musculoskeletal system during pregnancy (O26.899,O99.350)  Heartburn during pregnancy in third trimester (O26.893,R12)         Insurance (Authorized # of Visits): Assessment: Ms. Rod Jameson  is a 30year old y/o female, 36 weeks gestation, who presents to therapy with initial complaints of LBP and B hip 5/10 pain.  Patient reports overall muscle soreness especially in (2-3/10 pain) low back and order Epsom Salts for  anti Patient instructed on Levator Ani contraction inverse to diaphragmatic breathing to allow for pelvic brace with ADLs without valsalva.  MET     Patient exhibits an increase in Levator Ani/ Transverse abdominis strength from 3/5 with 5 count hold to 3/5  wit Mindfulness Body Scan Contract/ Relax with DB for PNS activation TM Pelvic brace fr 2.0 mph for 10'  Piriformis Tennis Ball MFR with DB 3' x2    Seated Yoga 10 DB: Ext  Spinal twist TM Pelvic brace fr 2.0 mph for 10'  Piriformis Tennis [place] MFR with DB 3' Pelvic Brace with ADLs  Sit to stand   Log Roll with supine to sit  Use aerobic step with Pelvic brace to put infant into/ out of Crib  Piriformis [facility] MFR with DB 3' x2    Seated Yoga 10 DB: Ext  Spinal twist  Pelvic brace with lifting chair/ Chil Correctly using and coordinating your pelvic floor and abdominal muscles can be the key to controlling your incontinence problem. The pelvic brace exercise creates an internal girdle to support your bladder and pelvic organs.  First learn to perform the pel ? Remember, do not bulge your belly, strain or allow movement in the pelvis or back. ? If you feel the quality of your exercise decline by starting to strain or bulge the abdominal muscles, stop your exercise session.   © 2004, [employer]

## 2019-09-30 ENCOUNTER — OFFICE VISIT (OUTPATIENT)
Dept: PHYSICAL THERAPY | Age: 29
End: 2019-09-30
Attending: FAMILY MEDICINE
Payer: COMMERCIAL

## 2019-09-30 PROCEDURE — 97535 SELF CARE MNGMENT TRAINING: CPT

## 2019-09-30 PROCEDURE — 97112 NEUROMUSCULAR REEDUCATION: CPT

## 2019-09-30 PROCEDURE — 97140 MANUAL THERAPY 1/> REGIONS: CPT

## 2019-09-30 PROCEDURE — 97110 THERAPEUTIC EXERCISES: CPT

## 2019-09-30 NOTE — PROGRESS NOTES
Progress Note  Dx: Pregnancy related hip pain in third trimester, antepartum (O26.893,M25.559)  Disorder of musculoskeletal system during pregnancy (O26.899,O99.350)  Heartburn during pregnancy in third trimester (O26.893,R12)         Insurance (Authorized Assessment: Ms. Deion Kaplan  is a 30year old y/o female, 37 weeks gestation tomorrow, who presents to therapy with initial complaints of LBP and B hip 5/10 pain.  Patient reports overall muscle soreness especially in  low back and is performing modified Yoga/ Pi Patient exhibits an increase in myofascial pelvic floor soft tissue mobility allowing for function in last Trimester of Pregnancy.  MET     LTG 6-12 visits  Patient instructed on Levator Ani contraction inverse to diaphragmatic breathing to allow for pelvic Pelvic brace with lifting chair TM Pelvic brace fr 2.0 mph 'x2  Piriformis Tennis Ball MFR with DB 3' x2    Seated Yoga 10 DB: Ext  Spinal twist  Pelvic brace with lifting chair/  TM Pelvic brace fr 2.0 mph for 10'  Mindfulness Body Scan Contract CTR/ MFR to ST lig./ Gluteals/ Pir/ Lumbar paraspinals in side lying position with pillows under head/ abdomen/ between legs  -Slouch    Massage to web space with DB    inguinal/ Round Ligament release/ scoop L>R  CTR to LAQ  IH/ II n. release [facility] Therapeutic Exercise: TM Pelvic brace crab walking forward    Charges: 1TE, 1SC, 1NM, 1MT       Total Timed Treatment: 60 min  Total Treatment Time: 62 min    THE PELVIC BRACE    The pelvic brace is a combined pelvic floor and transverse abdominal low inte ? Do not strain, bear down or bulge your abdomen as you do the exercise. PERFORMING THE PELVIC BRACE  ? Place your fingers on your lower abdomen just inside your pelvic bones. You should feel the low level contraction under your fingertips. ?  Contract ? Do the pelvic brace while you walk during the day or practice walking in place for ____ minutes.     Other activities:  ______________________________________________________________________    _____________________________________________________________

## 2019-10-01 ENCOUNTER — ROUTINE PRENATAL (OUTPATIENT)
Dept: OBGYN CLINIC | Facility: CLINIC | Age: 29
End: 2019-10-01
Payer: COMMERCIAL

## 2019-10-01 ENCOUNTER — HOSPITAL ENCOUNTER (OUTPATIENT)
Facility: HOSPITAL | Age: 29
Setting detail: OBSERVATION
Discharge: HOME | End: 2019-10-01
Attending: OBSTETRICS & GYNECOLOGY | Admitting: OBSTETRICS & GYNECOLOGY
Payer: COMMERCIAL

## 2019-10-01 ENCOUNTER — APPOINTMENT (OUTPATIENT)
Dept: ULTRASOUND IMAGING | Facility: HOSPITAL | Age: 29
End: 2019-10-01
Attending: OBSTETRICS & GYNECOLOGY
Payer: COMMERCIAL

## 2019-10-01 VITALS
DIASTOLIC BLOOD PRESSURE: 58 MMHG | SYSTOLIC BLOOD PRESSURE: 110 MMHG | WEIGHT: 163 LBS | HEIGHT: 63 IN | BODY MASS INDEX: 28.88 KG/M2

## 2019-10-01 VITALS
BODY MASS INDEX: 29 KG/M2 | DIASTOLIC BLOOD PRESSURE: 66 MMHG | HEART RATE: 106 BPM | SYSTOLIC BLOOD PRESSURE: 114 MMHG | RESPIRATION RATE: 14 BRPM | WEIGHT: 162 LBS

## 2019-10-01 DIAGNOSIS — Z34.83 PRENATAL CARE, SUBSEQUENT PREGNANCY IN THIRD TRIMESTER: ICD-10-CM

## 2019-10-01 DIAGNOSIS — Z23 NEED FOR VACCINATION: ICD-10-CM

## 2019-10-01 DIAGNOSIS — Z34.83 PRENATAL CARE, SUBSEQUENT PREGNANCY, THIRD TRIMESTER: Primary | ICD-10-CM

## 2019-10-01 PROCEDURE — 59025 FETAL NON-STRESS TEST: CPT | Performed by: OBSTETRICS & GYNECOLOGY

## 2019-10-01 PROCEDURE — 81002 URINALYSIS NONAUTO W/O SCOPE: CPT | Performed by: OBSTETRICS & GYNECOLOGY

## 2019-10-01 PROCEDURE — 90471 IMMUNIZATION ADMIN: CPT | Performed by: OBSTETRICS & GYNECOLOGY

## 2019-10-01 PROCEDURE — 90686 IIV4 VACC NO PRSV 0.5 ML IM: CPT | Performed by: OBSTETRICS & GYNECOLOGY

## 2019-10-01 PROCEDURE — 76815 OB US LIMITED FETUS(S): CPT | Performed by: OBSTETRICS & GYNECOLOGY

## 2019-10-01 NOTE — PROGRESS NOTES
Pt is a 30year old female admitted to TRG4/TRG4-A, Patient presents with:  R/o Rom: Pt reports leaking of fld     Pt is 37w0d intra-uterine pregnancy. . Reports +fetal movement. History obtained, consents signed.  Oriented to room, staff, and plan of car

## 2019-10-02 ENCOUNTER — APPOINTMENT (OUTPATIENT)
Dept: PHYSICAL THERAPY | Age: 29
End: 2019-10-02
Attending: FAMILY MEDICINE
Payer: COMMERCIAL

## 2019-10-02 NOTE — NST
Nonstress Test   Patient: Alyse Silverio    Gestation: 37w0d    NST:       Variability: Moderate           Accelerations: Yes           Decelerations: None            Baseline: 130 BPM           Uterine Irritability: No           Contractions: Irregular

## 2019-10-07 ENCOUNTER — OFFICE VISIT (OUTPATIENT)
Dept: PHYSICAL THERAPY | Age: 29
End: 2019-10-07
Attending: FAMILY MEDICINE
Payer: COMMERCIAL

## 2019-10-07 ENCOUNTER — ROUTINE PRENATAL (OUTPATIENT)
Dept: OBGYN CLINIC | Facility: CLINIC | Age: 29
End: 2019-10-07
Payer: COMMERCIAL

## 2019-10-07 VITALS
HEIGHT: 63 IN | BODY MASS INDEX: 29.23 KG/M2 | DIASTOLIC BLOOD PRESSURE: 58 MMHG | WEIGHT: 165 LBS | SYSTOLIC BLOOD PRESSURE: 116 MMHG

## 2019-10-07 DIAGNOSIS — Z22.330 GBS CARRIER: ICD-10-CM

## 2019-10-07 DIAGNOSIS — R12 HEARTBURN DURING PREGNANCY IN THIRD TRIMESTER: ICD-10-CM

## 2019-10-07 DIAGNOSIS — M25.559 PREGNANCY RELATED HIP PAIN IN THIRD TRIMESTER, ANTEPARTUM: ICD-10-CM

## 2019-10-07 DIAGNOSIS — O26.893 HEARTBURN DURING PREGNANCY IN THIRD TRIMESTER: ICD-10-CM

## 2019-10-07 DIAGNOSIS — Z34.83 PRENATAL CARE, SUBSEQUENT PREGNANCY, THIRD TRIMESTER: Primary | ICD-10-CM

## 2019-10-07 DIAGNOSIS — O26.893 PREGNANCY RELATED HIP PAIN IN THIRD TRIMESTER, ANTEPARTUM: ICD-10-CM

## 2019-10-07 DIAGNOSIS — O99.891 DISORDER OF MUSCULOSKELETAL SYSTEM DURING PREGNANCY: ICD-10-CM

## 2019-10-07 PROBLEM — Z01.419 WELL WOMAN EXAM WITH ROUTINE GYNECOLOGICAL EXAM: Status: RESOLVED | Noted: 2019-01-02 | Resolved: 2019-10-07

## 2019-10-07 PROBLEM — O47.03 PRETERM UTERINE CONTRACTIONS IN THIRD TRIMESTER, ANTEPARTUM (HCC): Status: RESOLVED | Noted: 2019-09-04 | Resolved: 2019-10-07

## 2019-10-07 PROBLEM — O47.03 PRETERM UTERINE CONTRACTIONS IN THIRD TRIMESTER, ANTEPARTUM: Status: RESOLVED | Noted: 2019-09-04 | Resolved: 2019-10-07

## 2019-10-07 PROCEDURE — 97110 THERAPEUTIC EXERCISES: CPT

## 2019-10-07 PROCEDURE — 81002 URINALYSIS NONAUTO W/O SCOPE: CPT | Performed by: OBSTETRICS & GYNECOLOGY

## 2019-10-07 PROCEDURE — 97535 SELF CARE MNGMENT TRAINING: CPT

## 2019-10-07 PROCEDURE — 97112 NEUROMUSCULAR REEDUCATION: CPT

## 2019-10-07 PROCEDURE — 97140 MANUAL THERAPY 1/> REGIONS: CPT

## 2019-10-07 NOTE — PROGRESS NOTES
STEPHANIE visit    +FM  No vaginal bleeding  No leaking fluid   Some contractions  Had mucus plug come out with a sting of bloody mucus. Denies HA, vision changes, epigastric pain.      30year old [id number] at 37w6d   BHARGAV 10/22/19 by LMP c/w 7 wk US  Cell free D

## 2019-10-07 NOTE — PROGRESS NOTES
Dx: Pregnancy related hip pain in third trimester, antepartum (O26.893,M25.559)  Disorder of musculoskeletal system during pregnancy (O26.899,O99.350)  Heartburn during pregnancy in third trimester (O26.893,R12)         Insurance (Authorized # of Visits) Assessment: Ms. Doretha St  is a 30year old y/o female, 45 weeks gestation tomorrow, who presents to therapy with initial complaints of LBP and B hip 5/10 pain.  Patient reports overall muscle soreness especially in  low back/ groin and is performing modified Y Patient instructed on Levator Ani/ Transverse Abdominis (Pelvic Brace) contraction to prevent Sacral Iliac torsion with ADLs with ADLs. MET     Patient exhibits an increase in myofascial pelvic floor soft tissue mobility allowing for function in last Trimes TX#: 8/12 Date: 9/30/2019  TX#: 9/12 Date: 10/7/2019  TX:10/12   TM Pelvic brace Crab .8 mph 'x2  Piriformis [facility] MFR with DB 3' x2    Seated Yoga 10 DB: Ext  Spinal twist  Pelvic brace with lifting chair TM Pelvic brace fr 2.0 mph 'x2  Piriformis T Thoracic Ext over SB  CTR/ MFR to ST lig./ Gluteals/ Pir/ Lumbar paraspinals in side lying position with pillows under head/ abdomen/ between legs  -Slouch  Stim of L & D:  Accupressure pts.  At web space/ Medial Ankle  Deep penetration to soften cervix  Ba Self Care:  Recommendation of Serola SI belting to prevent SI torsion, Pregnancy Body mechanics, Perineal massage, Episiotomy scar tissue massage 6 weeks post partum, Water soluble lubricant for intercourse, Pelvic brace with ADLs, Stimulation of L & D, Ch Many muscles in our body function all day long to keep us upright against gravity. We rarely notice that these muscles are even working as they perform at very low intensity.  The transverse abdominal and pelvic floor muscles are included in this group of m ? Breathe in, as you prepare to cough, bring your hand to your mouth and do the pelvic brace. ? Hold the muscle and cough. Now relax the brace. ? If coughing causes leakage try this activity while clearing your throat.   ? Repeat ___ times    The pelvi

## 2019-10-08 ENCOUNTER — TELEPHONE (OUTPATIENT)
Dept: OBGYN CLINIC | Facility: CLINIC | Age: 29
End: 2019-10-08

## 2019-10-08 NOTE — TELEPHONE ENCOUNTER
IOL schedule for 10/17/2019 at 0800 am. Patient agrees and verbalizes understanding.
Per pt per dr TREADWELL she was told that the office was supposed to call l&d to see when pt will have her induction, either this week or next week. Please advise and call pt when available date id set for her induction.  Thanks
Cooperative

## 2019-10-09 ENCOUNTER — TELEPHONE (OUTPATIENT)
Dept: PHYSICAL THERAPY | Age: 29
End: 2019-10-09

## 2019-10-09 ENCOUNTER — APPOINTMENT (OUTPATIENT)
Dept: PHYSICAL THERAPY | Age: 29
End: 2019-10-09
Attending: FAMILY MEDICINE
Payer: COMMERCIAL

## 2019-10-14 ENCOUNTER — ROUTINE PRENATAL (OUTPATIENT)
Dept: OBGYN CLINIC | Facility: CLINIC | Age: 29
End: 2019-10-14
Payer: COMMERCIAL

## 2019-10-14 VITALS
DIASTOLIC BLOOD PRESSURE: 80 MMHG | HEIGHT: 63 IN | SYSTOLIC BLOOD PRESSURE: 120 MMHG | BODY MASS INDEX: 29.06 KG/M2 | WEIGHT: 164 LBS

## 2019-10-14 DIAGNOSIS — Z34.83 PRENATAL CARE, SUBSEQUENT PREGNANCY IN THIRD TRIMESTER: ICD-10-CM

## 2019-10-14 DIAGNOSIS — Z34.93 ENCOUNTER FOR SUPERVISION OF NORMAL PREGNANCY IN THIRD TRIMESTER, UNSPECIFIED GRAVIDITY: Primary | ICD-10-CM

## 2019-10-14 PROCEDURE — 81002 URINALYSIS NONAUTO W/O SCOPE: CPT | Performed by: OBSTETRICS & GYNECOLOGY

## 2019-10-16 ENCOUNTER — TELEPHONE (OUTPATIENT)
Dept: OBGYN UNIT | Facility: HOSPITAL | Age: 29
End: 2019-10-16

## 2019-10-17 ENCOUNTER — HOSPITAL ENCOUNTER (INPATIENT)
Facility: HOSPITAL | Age: 29
LOS: 1 days | Discharge: HOME | End: 2019-10-18
Attending: OBSTETRICS & GYNECOLOGY | Admitting: OBSTETRICS & GYNECOLOGY
Payer: COMMERCIAL

## 2019-10-17 ENCOUNTER — APPOINTMENT (OUTPATIENT)
Dept: OBGYN CLINIC | Facility: HOSPITAL | Age: 29
End: 2019-10-17
Payer: COMMERCIAL

## 2019-10-17 PROCEDURE — 59400 OBSTETRICAL CARE: CPT | Performed by: OBSTETRICS & GYNECOLOGY

## 2019-10-17 PROCEDURE — 3E033VJ INTRODUCTION OF OTHER HORMONE INTO PERIPHERAL VEIN, PERCUTANEOUS APPROACH: ICD-10-PCS | Performed by: OBSTETRICS & GYNECOLOGY

## 2019-10-17 PROCEDURE — 10907ZC DRAINAGE OF AMNIOTIC FLUID, THERAPEUTIC FROM PRODUCTS OF CONCEPTION, VIA NATURAL OR ARTIFICIAL OPENING: ICD-10-PCS | Performed by: OBSTETRICS & GYNECOLOGY

## 2019-10-17 RX ORDER — TERBUTALINE SULFATE 1 MG/ML
0.25 INJECTION, SOLUTION SUBCUTANEOUS AS NEEDED
Status: DISCONTINUED | OUTPATIENT
Start: 2019-10-17 | End: 2019-10-17 | Stop reason: HOSPADM

## 2019-10-17 RX ORDER — BISACODYL 10 MG
10 SUPPOSITORY, RECTAL RECTAL ONCE AS NEEDED
Status: ACTIVE | OUTPATIENT
Start: 2019-10-17 | End: 2019-10-17

## 2019-10-17 RX ORDER — IBUPROFEN 600 MG/1
600 TABLET ORAL ONCE AS NEEDED
Status: DISCONTINUED | OUTPATIENT
Start: 2019-10-17 | End: 2019-10-17 | Stop reason: HOSPADM

## 2019-10-17 RX ORDER — IBUPROFEN 600 MG/1
600 TABLET ORAL EVERY 6 HOURS
Status: DISCONTINUED | OUTPATIENT
Start: 2019-10-17 | End: 2019-10-18

## 2019-10-17 RX ORDER — SIMETHICONE 80 MG
80 TABLET,CHEWABLE ORAL 3 TIMES DAILY PRN
Status: DISCONTINUED | OUTPATIENT
Start: 2019-10-17 | End: 2019-10-18

## 2019-10-17 RX ORDER — ZOLPIDEM TARTRATE 5 MG/1
5 TABLET ORAL NIGHTLY PRN
Status: DISCONTINUED | OUTPATIENT
Start: 2019-10-17 | End: 2019-10-18

## 2019-10-17 RX ORDER — AMMONIA INHALANTS 0.04 G/.3ML
0.3 INHALANT RESPIRATORY (INHALATION) AS NEEDED
Status: DISCONTINUED | OUTPATIENT
Start: 2019-10-17 | End: 2019-10-17 | Stop reason: HOSPADM

## 2019-10-17 RX ORDER — EPHEDRINE SULFATE/0.9% NACL/PF 25 MG/5 ML
5 SYRINGE (ML) INTRAVENOUS AS NEEDED
Status: DISCONTINUED | OUTPATIENT
Start: 2019-10-17 | End: 2019-10-17

## 2019-10-17 RX ORDER — NALBUPHINE HCL 10 MG/ML
2.5 AMPUL (ML) INJECTION
Status: DISCONTINUED | OUTPATIENT
Start: 2019-10-17 | End: 2019-10-17

## 2019-10-17 RX ORDER — DEXTROSE, SODIUM CHLORIDE, SODIUM LACTATE, POTASSIUM CHLORIDE, AND CALCIUM CHLORIDE 5; .6; .31; .03; .02 G/100ML; G/100ML; G/100ML; G/100ML; G/100ML
INJECTION, SOLUTION INTRAVENOUS AS NEEDED
Status: DISCONTINUED | OUTPATIENT
Start: 2019-10-17 | End: 2019-10-17 | Stop reason: HOSPADM

## 2019-10-17 RX ORDER — TRISODIUM CITRATE DIHYDRATE AND CITRIC ACID MONOHYDRATE 500; 334 MG/5ML; MG/5ML
30 SOLUTION ORAL AS NEEDED
Status: DISCONTINUED | OUTPATIENT
Start: 2019-10-17 | End: 2019-10-17 | Stop reason: HOSPADM

## 2019-10-17 RX ORDER — SODIUM CHLORIDE, SODIUM LACTATE, POTASSIUM CHLORIDE, CALCIUM CHLORIDE 600; 310; 30; 20 MG/100ML; MG/100ML; MG/100ML; MG/100ML
INJECTION, SOLUTION INTRAVENOUS CONTINUOUS
Status: DISCONTINUED | OUTPATIENT
Start: 2019-10-17 | End: 2019-10-17 | Stop reason: HOSPADM

## 2019-10-17 RX ORDER — ACETAMINOPHEN 325 MG/1
650 TABLET ORAL EVERY 6 HOURS PRN
Status: DISCONTINUED | OUTPATIENT
Start: 2019-10-17 | End: 2019-10-18

## 2019-10-17 RX ORDER — DOCUSATE SODIUM 100 MG/1
100 CAPSULE, LIQUID FILLED ORAL
Status: DISCONTINUED | OUTPATIENT
Start: 2019-10-17 | End: 2019-10-18

## 2019-10-17 NOTE — PROGRESS NOTES
Patient transferred to 1111/1111-A in wheelchair in stable condition. Infant in arms,  at side with belongings. Report phoned to [facility] prior to transfer.

## 2019-10-17 NOTE — PLAN OF CARE
Problem: BIRTH - VAGINAL/ SECTION  Goal: Fetal and maternal status remain reassuring during the birth process  Description  INTERVENTIONS:  - Monitor vital signs  - Monitor fetal heart rate  - Monitor uterine activity  - Monitor labor progression frequently for physical needs  - Identify cognitive and physical deficits and behaviors that affect risk of falls.   - [place] fall precautions as indicated by assessment.  - Educate pt/family on patient safety including physical limitations  - Instruct p

## 2019-10-17 NOTE — L&D DELIVERY NOTE
Nusrat, Pending Kacey Kinsey [[id number]]    Labor Events     labor?:  No   steroids?:  None  Antibiotics received during labor?:  Yes  Antibiotics (enter # doses in comment):  ampicillin  Rupture date/time:  10/17/2019 0833     Rupture type:  AROM

## 2019-10-17 NOTE — H&P
[facility] Bills Patient Status:  Inpatient    1990 MRN [id number]   Location [address] [facility] Attending Negar Jhaveri, [address] [facility] Day # 0 PCP Km Dobbs MD     Date of Admission:  10/17/2 4      Binge frequency: Less than monthly      Home Meds: famoTIDine 20 MG Oral Tab, Take 1 tablet (20 mg total) by mouth 2 (two) times daily. , Disp: 60 tablet, Rfl: 2, Past Week at Unknown time  doxylamine-pyridoxine (DICLEGIS) 10-10 MG Oral Tab EC, Take

## 2019-10-17 NOTE — PLAN OF CARE
Problem: SAFETY ADULT - FALL  Goal: Free from fall injury  Description  INTERVENTIONS:  - Assess pt frequently for physical needs  - Identify cognitive and physical deficits and behaviors that affect risk of falls.   - [place] fall precautions as indica

## 2019-10-17 NOTE — PROGRESS NOTES
Patient admitted to room 108 for induction of labor. EFA 39.2 weeks. EFM tested and applied. Plan of care explained, questions encouraged and answered.

## 2019-10-18 ENCOUNTER — TELEPHONE (OUTPATIENT)
Dept: OBGYN CLINIC | Facility: CLINIC | Age: 29
End: 2019-10-18

## 2019-10-18 VITALS
BODY MASS INDEX: 29.06 KG/M2 | WEIGHT: 164 LBS | RESPIRATION RATE: 18 BRPM | TEMPERATURE: 98 F | HEART RATE: 80 BPM | SYSTOLIC BLOOD PRESSURE: 127 MMHG | DIASTOLIC BLOOD PRESSURE: 78 MMHG | HEIGHT: 62.99 IN | OXYGEN SATURATION: 98 %

## 2019-10-18 NOTE — TELEPHONE ENCOUNTER
Pt wanted to check if she could get released earlier today. Dr Christiano Moreno stated she wouldnt get back to do circumcision until 4/5 o'clock. Informed patient that Dr. Felicia Severino is busy with patients as well and she may have to wait for Dr. Christiano Moreno.

## 2019-10-18 NOTE — PROGRESS NOTES
[facility]  Post-Partum Progress Note    Selvin Silverio Patient Status:  Inpatient    1990 MRN [id number]   [facility] 1SW-J Attending Padmini Watkins, [address] [facility] Day # 1 PCP You Rodriguez MD     SUBJECTIVE:  Patient doing well w

## 2019-10-19 NOTE — PROGRESS NOTES
Discharge instructions reviewed with pt all questions answered. ID bands verified with infants. Pt discharged home , taken to car by wheelchair.

## 2019-10-21 ENCOUNTER — APPOINTMENT (OUTPATIENT)
Dept: PHYSICAL THERAPY | Age: 29
End: 2019-10-21
Attending: FAMILY MEDICINE
Payer: COMMERCIAL

## 2019-10-22 ENCOUNTER — TELEPHONE (OUTPATIENT)
Dept: OBGYN UNIT | Facility: HOSPITAL | Age: 29
End: 2019-10-22

## 2019-10-25 ENCOUNTER — TELEPHONE (OUTPATIENT)
Dept: OBGYN CLINIC | Facility: CLINIC | Age: 29
End: 2019-10-25

## 2019-11-26 ENCOUNTER — TELEPHONE (OUTPATIENT)
Dept: OBGYN CLINIC | Facility: CLINIC | Age: 29
End: 2019-11-26

## 2019-11-26 ENCOUNTER — POSTPARTUM (OUTPATIENT)
Dept: OBGYN CLINIC | Facility: CLINIC | Age: 29
End: 2019-11-26
Payer: COMMERCIAL

## 2019-11-26 VITALS
WEIGHT: 147 LBS | HEIGHT: 63 IN | BODY MASS INDEX: 26.05 KG/M2 | SYSTOLIC BLOOD PRESSURE: 116 MMHG | HEART RATE: 112 BPM | DIASTOLIC BLOOD PRESSURE: 82 MMHG

## 2019-11-26 PROBLEM — Z22.330 GBS CARRIER: Status: RESOLVED | Noted: 2019-09-19 | Resolved: 2019-11-26

## 2019-11-26 PROBLEM — Z34.83 PRENATAL CARE, SUBSEQUENT PREGNANCY IN THIRD TRIMESTER: Status: RESOLVED | Noted: 2019-03-07 | Resolved: 2019-11-26

## 2019-11-26 PROBLEM — Z34.83 PRENATAL CARE, SUBSEQUENT PREGNANCY IN THIRD TRIMESTER (HCC): Status: RESOLVED | Noted: 2019-03-07 | Resolved: 2019-11-26

## 2019-11-26 RX ORDER — DICLOXACILLIN SODIUM 250 MG/1
250 CAPSULE ORAL 4 TIMES DAILY
Qty: 40 CAPSULE | Refills: 0 | Status: SHIPPED | OUTPATIENT
Start: 2019-11-26 | End: 2020-06-16 | Stop reason: ALTCHOICE

## 2019-11-26 RX ORDER — ACETAMINOPHEN AND CODEINE PHOSPHATE 120; 12 MG/5ML; MG/5ML
0.35 SOLUTION ORAL DAILY
Qty: 28 TABLET | Refills: 11 | Status: SHIPPED | OUTPATIENT
Start: 2019-11-26 | End: 2019-12-24

## 2019-11-26 NOTE — TELEPHONE ENCOUNTER
Per pt, she saw dr BROWNE earlier this morning and was told by dr BROWNE that if her symptoms worsen to just call us and she will prescribe something for her possible mastitis. Please advise and call pt.  Thanks

## 2019-11-26 NOTE — PROGRESS NOTES
MARLEN    Sohan Marroquin is a 30year old female [id number] here for 6 week post-partum visit. Patient delivered a  male infant on 10/17/19 via . Patient desires OCP for contraception. Patient is breast feeding.    Patient denies symptoms of depression, E Disp: 40 capsule, Rfl: 0  •  Prenatal Vit-Fe Sulfate-FA (PRENATAL VITAMIN OR), Take by mouth., Disp: , Rfl:     ALLERGIES:  No Known Allergies    PHYSICAL EXAM  Blood pressure 116/82, pulse 112, height 63\", weight 147 lb (66.7 kg), last menstrual period 0

## 2020-06-16 ENCOUNTER — OFFICE VISIT (OUTPATIENT)
Dept: OBGYN CLINIC | Facility: CLINIC | Age: 30
End: 2020-06-16
Payer: COMMERCIAL

## 2020-06-16 VITALS
SYSTOLIC BLOOD PRESSURE: 102 MMHG | WEIGHT: 139 LBS | HEIGHT: 63 IN | BODY MASS INDEX: 24.63 KG/M2 | DIASTOLIC BLOOD PRESSURE: 70 MMHG | HEART RATE: 93 BPM

## 2020-06-16 DIAGNOSIS — Z01.419 ENCOUNTER FOR WELL WOMAN EXAM WITH ROUTINE GYNECOLOGICAL EXAM: Primary | ICD-10-CM

## 2020-06-16 DIAGNOSIS — Z12.4 CERVICAL CANCER SCREENING: ICD-10-CM

## 2020-06-16 PROCEDURE — 99395 PREV VISIT EST AGE 18-39: CPT | Performed by: OBSTETRICS & GYNECOLOGY

## 2020-06-16 PROCEDURE — 87624 HPV HI-RISK TYP POOLED RSLT: CPT | Performed by: OBSTETRICS & GYNECOLOGY

## 2020-06-16 PROCEDURE — 88175 CYTOPATH C/V AUTO FLUID REDO: CPT | Performed by: OBSTETRICS & GYNECOLOGY

## 2020-06-16 NOTE — PROGRESS NOTES
Elma Adame is a 26year old female [id number] Patient's last menstrual period was 05/19/2020 (approximate). Patient presents with:  Wellness Visit  . Patient has no complaints, still breastfeeding.  Declines b.c.    OBSTETRICS HISTORY:  OB History   Grav Frequency: 2-4 times a month        Drinks per session: 3 or 4        Binge frequency: Less than monthly      Drug use: No      Sexual activity: Not on file    Lifestyle      Physical activity:        Days per week: Not on file        Minutes per sessio Systems:  Constitutional:  Denies fatigue, night sweats, hot flashes  Eyes:  denies blurred or double vision  Cardiovascular:  denies chest pain or palpitations  Respiratory:  denies shortness of breath  Gastrointestinal:  denies heartburn, abdominal pain, COLLECTION;  Future  -     THINPREP PAP SMEAR B; Future

## 2020-08-28 ENCOUNTER — APPOINTMENT (OUTPATIENT)
Dept: LAB | Age: 30
End: 2020-08-28
Attending: FAMILY MEDICINE
Payer: COMMERCIAL

## 2020-08-28 DIAGNOSIS — R50.9 FEVER, UNSPECIFIED FEVER CAUSE: ICD-10-CM

## 2020-08-28 DIAGNOSIS — J03.90 TONSILLITIS: ICD-10-CM

## 2020-08-30 LAB — SARS-COV-2 RNA RESP QL NAA+PROBE: DETECTED

## 2020-10-08 ENCOUNTER — ORDER TRANSCRIPTION (OUTPATIENT)
Dept: PHYSICAL THERAPY | Facility: HOSPITAL | Age: 30
End: 2020-10-08

## 2020-10-08 DIAGNOSIS — M46.1 SACROILIITIS (HCC): Primary | ICD-10-CM

## 2020-10-08 DIAGNOSIS — M62.89 PELVIC FLOOR DYSFUNCTION: ICD-10-CM

## 2020-10-08 DIAGNOSIS — R32 URINARY INCONTINENCE: ICD-10-CM

## 2020-10-29 ENCOUNTER — TELEPHONE (OUTPATIENT)
Dept: OBGYN CLINIC | Facility: CLINIC | Age: 30
End: 2020-10-29

## 2020-10-29 NOTE — TELEPHONE ENCOUNTER
Spoke with patient. She has been exercising since before the pregnancy. Instructed her that it is okay to continue with no twisting or heavy lifting. To know her bodies limits and drink plenty of fluids.  Discussed COVID and that it is her decision with the

## 2020-11-06 ENCOUNTER — TELEPHONE (OUTPATIENT)
Dept: OBGYN CLINIC | Facility: CLINIC | Age: 30
End: 2020-11-06

## 2020-11-06 NOTE — TELEPHONE ENCOUNTER
Spoke with patient. She is having nausea and vomiting with pregnancy. She was placed on diclegis with her other 3 pregnancies. Requesting a prescription. Has appointment 11/20/2020 for first OB. Will route. Patient verbalized understanding.

## 2020-11-09 RX ORDER — DOXYLAMINE SUCCINATE AND PYRIDOXINE HYDROCHLORIDE, DELAYED RELEASE TABLETS 10 MG/10 MG 10; 10 MG/1; MG/1
2 TABLET, DELAYED RELEASE ORAL NIGHTLY
Qty: 120 TABLET | Refills: 1 | Status: SHIPPED | OUTPATIENT
Start: 2020-11-09

## 2020-11-10 ENCOUNTER — TELEPHONE (OUTPATIENT)
Dept: PHYSICAL THERAPY | Age: 30
End: 2020-11-10

## 2020-11-18 ENCOUNTER — TELEPHONE (OUTPATIENT)
Dept: OBGYN CLINIC | Facility: CLINIC | Age: 30
End: 2020-11-18

## 2020-11-18 NOTE — TELEPHONE ENCOUNTER
Pt states she has been having some spotting. Only noticeable when wiping. Has had pad one for one hour and nothing there. Was pink to dark brown Monday and yesterday, today is bright red, but only when wiping.  Has some cramping as well, but that is normal

## 2020-11-19 NOTE — TELEPHONE ENCOUNTER
Verified with Dr. Carla Arnold after verifying blood type. Will evaluate her in office tomorrow. No labs needed today.

## 2020-11-20 ENCOUNTER — OFFICE VISIT (OUTPATIENT)
Dept: OBGYN CLINIC | Facility: CLINIC | Age: 30
End: 2020-11-20
Payer: COMMERCIAL

## 2020-11-20 VITALS
BODY MASS INDEX: 25.69 KG/M2 | WEIGHT: 145 LBS | SYSTOLIC BLOOD PRESSURE: 116 MMHG | DIASTOLIC BLOOD PRESSURE: 62 MMHG | HEIGHT: 63 IN

## 2020-11-20 DIAGNOSIS — O36.80X0 PREGNANCY WITH INCONCLUSIVE FETAL VIABILITY, SINGLE OR UNSPECIFIED FETUS: Primary | ICD-10-CM

## 2020-11-20 DIAGNOSIS — O36.80X0 PREGNANCY WITH INCONCLUSIVE FETAL VIABILITY, SINGLE OR UNSPECIFIED FETUS: ICD-10-CM

## 2020-11-20 DIAGNOSIS — O03.9 MISCARRIAGE: Primary | ICD-10-CM

## 2020-11-20 PROCEDURE — 3078F DIAST BP <80 MM HG: CPT | Performed by: OBSTETRICS & GYNECOLOGY

## 2020-11-20 PROCEDURE — 99212 OFFICE O/P EST SF 10 MIN: CPT | Performed by: OBSTETRICS & GYNECOLOGY

## 2020-11-20 PROCEDURE — 3008F BODY MASS INDEX DOCD: CPT | Performed by: OBSTETRICS & GYNECOLOGY

## 2020-11-20 PROCEDURE — 76817 TRANSVAGINAL US OBSTETRIC: CPT | Performed by: OBSTETRICS & GYNECOLOGY

## 2020-11-20 PROCEDURE — 3074F SYST BP LT 130 MM HG: CPT | Performed by: OBSTETRICS & GYNECOLOGY

## 2020-11-20 NOTE — PROGRESS NOTES
transvaginal us performed  the endometrium contains an elongated gestational sac that measures 1.61 cm=5w0d  no ys or fetal pole noted  bilateral ovaries are wnls

## 2020-11-20 NOTE — PROGRESS NOTES
Started spotting when she wiped 2 days ago. No cramping she had 3 normal pregnancies probable miscarriage was discussed options of D&C observation and  pill were discussed with her. She was offered an ultrasound in a week.   Share information was

## 2020-11-24 ENCOUNTER — OFFICE VISIT (OUTPATIENT)
Dept: PHYSICAL THERAPY | Age: 30
End: 2020-11-24
Attending: PHYSICIAN ASSISTANT
Payer: COMMERCIAL

## 2020-11-24 DIAGNOSIS — M46.1 SACROILIITIS (HCC): ICD-10-CM

## 2020-11-24 DIAGNOSIS — R32 URINARY INCONTINENCE: ICD-10-CM

## 2020-11-24 DIAGNOSIS — M62.89 PELVIC FLOOR DYSFUNCTION: ICD-10-CM

## 2020-11-24 PROCEDURE — 97161 PT EVAL LOW COMPLEX 20 MIN: CPT

## 2020-11-25 NOTE — PROGRESS NOTES
MUSCULOSKELETAL AND PELVIC FLOOR EVALUATION:   Referring Physician: Dr. Georgiana Blair  Diagnosis:  Stress incontinence, diastasis recti     Date of Service: 11/25/2020     PATIENT SUMMARY   Sohan Marroquin is a 26year old female  who presents to therapy tod day  Amount of leakage: small   Pad use: panty liner with exercises   Pad Change frequency: see above  Nocturia:  0-1   Fluid Intake: water   Bladder irritants: eats clean   Urine Stop test: not tested   Post void dribble:  None   Hovering:  none  Empty bl Yes      Will cont with Internal exam next appt or when bleeding for the pt stops     Today's Treatment and Response:  Psoas release, piriformis stretch, foam roller for the thoracic spine and the piriformis , TrA and PPT.  Discussed avoiding planks, twisti any questions, please contact me at Dept: [phone]    Sincerely,  Electronically signed by therapist: Clem Holloway PT  [de-identified] certification required: Yes  I certify the need for these services furnished under this plan of treatment and while

## 2020-12-02 ENCOUNTER — OFFICE VISIT (OUTPATIENT)
Dept: PHYSICAL THERAPY | Age: 30
End: 2020-12-02
Attending: PHYSICIAN ASSISTANT
Payer: COMMERCIAL

## 2020-12-02 PROCEDURE — 97110 THERAPEUTIC EXERCISES: CPT

## 2020-12-02 NOTE — PROGRESS NOTES
Dx:  Stress incontinence, diastasis recti         Insurance (Authorized # of Visits):   6           Authorizing Physician: Dr. Jose Rafael Cabrera  Next MD visit: none scheduled  Fall Risk: standard         Precautions: n/a             Subjective: pt is performing HE

## 2020-12-04 ENCOUNTER — APPOINTMENT (OUTPATIENT)
Dept: PHYSICAL THERAPY | Age: 30
End: 2020-12-04
Attending: PHYSICIAN ASSISTANT
Payer: COMMERCIAL

## 2020-12-09 ENCOUNTER — OFFICE VISIT (OUTPATIENT)
Dept: PHYSICAL THERAPY | Age: 30
End: 2020-12-09
Attending: PHYSICIAN ASSISTANT
Payer: COMMERCIAL

## 2020-12-09 PROCEDURE — 97110 THERAPEUTIC EXERCISES: CPT

## 2020-12-09 NOTE — PROGRESS NOTES
Dx:  Stress incontinence, diastasis recti         Insurance (Authorized # of Visits):   6           Authorizing Physician: Dr. Mick Bosworth  Next MD visit: none scheduled  Fall Risk: standard         Precautions: n/a             Subjective: pt is performing HE

## 2020-12-11 ENCOUNTER — APPOINTMENT (OUTPATIENT)
Dept: PHYSICAL THERAPY | Age: 30
End: 2020-12-11
Attending: PHYSICIAN ASSISTANT
Payer: COMMERCIAL

## 2020-12-16 ENCOUNTER — OFFICE VISIT (OUTPATIENT)
Dept: PHYSICAL THERAPY | Age: 30
End: 2020-12-16
Attending: PHYSICIAN ASSISTANT
Payer: COMMERCIAL

## 2020-12-16 PROCEDURE — 97110 THERAPEUTIC EXERCISES: CPT

## 2020-12-16 NOTE — PROGRESS NOTES
Dx:  Stress incontinence, diastasis recti         Insurance (Authorized # of Visits):   6           Authorizing Physician: Dr. Daniela Reyes  Next MD visit: none scheduled  Fall Risk: standard         Precautions: n/a             Subjective: pt is performing HE 45 min  Total Treatment Time:  45 min

## 2020-12-18 ENCOUNTER — APPOINTMENT (OUTPATIENT)
Dept: PHYSICAL THERAPY | Age: 30
End: 2020-12-18
Attending: PHYSICIAN ASSISTANT
Payer: COMMERCIAL

## 2020-12-23 ENCOUNTER — OFFICE VISIT (OUTPATIENT)
Dept: PHYSICAL THERAPY | Age: 30
End: 2020-12-23
Attending: PHYSICIAN ASSISTANT
Payer: COMMERCIAL

## 2020-12-23 PROCEDURE — 97110 THERAPEUTIC EXERCISES: CPT

## 2020-12-23 NOTE — PROGRESS NOTES
Dx:  Stress incontinence, diastasis recti         Insurance (Authorized # of Visits):   6           Authorizing Physician: Dr. Sima Baugh  Next MD visit: none scheduled  Fall Risk: standard         Precautions: n/a             Subjective: pt is performing HE direction   L  4-5  R  3-4   Clams legs elevated up 10 x GTB 10 x   Lateral side walking   RTB: 7-8 in both directions  Front walking 10 x RTB   8 x      Bridge with march 10 x              HEP:  Bent leg fall outs, contractions and fast contractions    Ch

## 2020-12-30 ENCOUNTER — OFFICE VISIT (OUTPATIENT)
Dept: PHYSICAL THERAPY | Age: 30
End: 2020-12-30
Attending: PHYSICIAN ASSISTANT
Payer: COMMERCIAL

## 2020-12-30 PROCEDURE — 97110 THERAPEUTIC EXERCISES: CPT

## 2020-12-30 NOTE — PROGRESS NOTES
Dx:  Stress incontinence, diastasis recti         Insurance (Authorized # of Visits):   6           Authorizing Physician: Dr. Chuck Johnson  Next MD visit: none scheduled  Fall Risk: standard         Precautions: n/a             Subjective:   Feeling better.  Mckayla Mcclure PPU 5 x 10 sec holds  L long axis distraction 3 x 30 secs  Side lying STM L piriformis 5 mins   Piriformis stretch   Foam rolling 5 mins   ITB stretch 2 x 30 secs       STM L R piriformis 5 mins  Psoas release B 5 mins  Clams red band 10 x with PF contra

## 2021-01-14 ENCOUNTER — OFFICE VISIT (OUTPATIENT)
Dept: PHYSICAL THERAPY | Age: 31
End: 2021-01-14
Attending: FAMILY MEDICINE
Payer: COMMERCIAL

## 2021-01-14 PROCEDURE — 97110 THERAPEUTIC EXERCISES: CPT

## 2021-01-14 NOTE — PROGRESS NOTES
Dx:  Stress incontinence, diastasis recti         Insurance (Authorized # of Visits):   8           Authorizing Physician: Dr. Carter ref.  provider found  Next MD visit: none scheduled  Fall Risk: standard         Precautions: n/a             Subjective:  No l shoulder height.  10 x 2    Mult walk outs 10 x GTB  Planks side- mult ideas hip abd and elbow to knee 10 x each     Bridge with march  10 x   PPU 5 x 10 sec holds  L long axis distraction 3 x 30 secs  Side lying STM L piriformis 5 mins   Piriformis stretch

## 2021-03-09 ENCOUNTER — LAB ENCOUNTER (OUTPATIENT)
Dept: LAB | Facility: HOSPITAL | Age: 31
End: 2021-03-09
Attending: SPECIALIST
Payer: COMMERCIAL

## 2021-03-09 DIAGNOSIS — Z41.1 ENCOUNTER FOR COSMETIC PROCEDURE: ICD-10-CM

## 2021-03-11 LAB — SARS-COV-2 RNA RESP QL NAA+PROBE: NOT DETECTED

## 2021-12-22 NOTE — PROGRESS NOTES
No history of gestational diabetes. She is taking her vitamins. One hour glucose and CBC were done today. Return in 3 weeks. She complains of some swelling of her feet and hands blood pressure normal.  She has a breast pump from her last pregnancy.   Sh Donor Site Anesthesia Type: same as repair anesthesia

## 2022-09-16 ENCOUNTER — OFFICE VISIT (OUTPATIENT)
Dept: OBGYN CLINIC | Facility: CLINIC | Age: 32
End: 2022-09-16
Payer: COMMERCIAL

## 2022-09-16 VITALS
WEIGHT: 157.63 LBS | DIASTOLIC BLOOD PRESSURE: 74 MMHG | HEIGHT: 63 IN | BODY MASS INDEX: 27.93 KG/M2 | SYSTOLIC BLOOD PRESSURE: 116 MMHG | HEART RATE: 52 BPM

## 2022-09-16 DIAGNOSIS — Z01.419 ENCOUNTER FOR WELL WOMAN EXAM WITH ROUTINE GYNECOLOGICAL EXAM: Primary | ICD-10-CM

## 2022-09-16 DIAGNOSIS — Z12.4 CERVICAL CANCER SCREENING: ICD-10-CM

## 2022-09-16 PROBLEM — O26.893 PREGNANCY RELATED HIP PAIN IN THIRD TRIMESTER, ANTEPARTUM (HCC): Status: RESOLVED | Noted: 2019-08-13 | Resolved: 2022-09-16

## 2022-09-16 PROBLEM — O03.9 MISCARRIAGE (HCC): Status: RESOLVED | Noted: 2020-11-20 | Resolved: 2022-09-16

## 2022-09-16 PROBLEM — Z34.90 PREGNANCY: Status: RESOLVED | Noted: 2019-09-04 | Resolved: 2022-09-16

## 2022-09-16 PROBLEM — O99.891 DISORDER OF MUSCULOSKELETAL SYSTEM DURING PREGNANCY: Status: RESOLVED | Noted: 2019-08-13 | Resolved: 2022-09-16

## 2022-09-16 PROBLEM — O26.893 HEARTBURN DURING PREGNANCY IN THIRD TRIMESTER (HCC): Status: RESOLVED | Noted: 2019-08-13 | Resolved: 2022-09-16

## 2022-09-16 PROBLEM — M25.559 PREGNANCY RELATED HIP PAIN IN THIRD TRIMESTER, ANTEPARTUM (HCC): Status: RESOLVED | Noted: 2019-08-13 | Resolved: 2022-09-16

## 2022-09-16 PROBLEM — Z34.90 NORMAL PREGNANCY (HCC): Status: RESOLVED | Noted: 2019-09-19 | Resolved: 2022-09-16

## 2022-09-16 PROBLEM — Z34.90 NORMAL PREGNANCY: Status: RESOLVED | Noted: 2019-09-19 | Resolved: 2022-09-16

## 2022-09-16 PROBLEM — R12 HEARTBURN DURING PREGNANCY IN THIRD TRIMESTER (HCC): Status: RESOLVED | Noted: 2019-08-13 | Resolved: 2022-09-16

## 2022-09-16 PROBLEM — Z34.90 PREGNANCY (HCC): Status: RESOLVED | Noted: 2019-09-04 | Resolved: 2022-09-16

## 2022-09-16 PROBLEM — O26.893 PREGNANCY RELATED HIP PAIN IN THIRD TRIMESTER, ANTEPARTUM: Status: RESOLVED | Noted: 2019-08-13 | Resolved: 2022-09-16

## 2022-09-16 PROBLEM — O99.891 DISORDER OF MUSCULOSKELETAL SYSTEM DURING PREGNANCY (HCC): Status: RESOLVED | Noted: 2019-08-13 | Resolved: 2022-09-16

## 2022-09-16 PROBLEM — O26.893 HEARTBURN DURING PREGNANCY IN THIRD TRIMESTER: Status: RESOLVED | Noted: 2019-08-13 | Resolved: 2022-09-16

## 2022-09-16 PROBLEM — R12 HEARTBURN DURING PREGNANCY IN THIRD TRIMESTER: Status: RESOLVED | Noted: 2019-08-13 | Resolved: 2022-09-16

## 2022-09-16 PROBLEM — M25.559 PREGNANCY RELATED HIP PAIN IN THIRD TRIMESTER, ANTEPARTUM: Status: RESOLVED | Noted: 2019-08-13 | Resolved: 2022-09-16

## 2022-09-16 PROBLEM — O03.9 MISCARRIAGE: Status: RESOLVED | Noted: 2020-11-20 | Resolved: 2022-09-16

## 2022-09-16 PROCEDURE — 3008F BODY MASS INDEX DOCD: CPT | Performed by: OBSTETRICS & GYNECOLOGY

## 2022-09-16 PROCEDURE — 87624 HPV HI-RISK TYP POOLED RSLT: CPT | Performed by: OBSTETRICS & GYNECOLOGY

## 2022-09-16 PROCEDURE — 3078F DIAST BP <80 MM HG: CPT | Performed by: OBSTETRICS & GYNECOLOGY

## 2022-09-16 PROCEDURE — 99395 PREV VISIT EST AGE 18-39: CPT | Performed by: OBSTETRICS & GYNECOLOGY

## 2022-09-16 PROCEDURE — 3074F SYST BP LT 130 MM HG: CPT | Performed by: OBSTETRICS & GYNECOLOGY

## 2022-09-19 LAB — HPV I/H RISK 1 DNA SPEC QL NAA+PROBE: NEGATIVE

## 2023-03-28 ENCOUNTER — OFFICE VISIT (OUTPATIENT)
Dept: FAMILY MEDICINE CLINIC | Facility: CLINIC | Age: 33
End: 2023-03-28
Payer: COMMERCIAL

## 2023-03-28 VITALS
SYSTOLIC BLOOD PRESSURE: 113 MMHG | BODY MASS INDEX: 26.58 KG/M2 | TEMPERATURE: 98 F | OXYGEN SATURATION: 100 % | HEIGHT: 63 IN | RESPIRATION RATE: 16 BRPM | HEART RATE: 79 BPM | DIASTOLIC BLOOD PRESSURE: 73 MMHG | WEIGHT: 150 LBS

## 2023-03-28 DIAGNOSIS — R21 RASH AND NONSPECIFIC SKIN ERUPTION: ICD-10-CM

## 2023-03-28 DIAGNOSIS — L28.2 PRURITIC RASH: Primary | ICD-10-CM

## 2023-03-28 PROCEDURE — 3078F DIAST BP <80 MM HG: CPT | Performed by: NURSE PRACTITIONER

## 2023-03-28 PROCEDURE — 3074F SYST BP LT 130 MM HG: CPT | Performed by: NURSE PRACTITIONER

## 2023-03-28 PROCEDURE — 99213 OFFICE O/P EST LOW 20 MIN: CPT | Performed by: NURSE PRACTITIONER

## 2023-03-28 PROCEDURE — 3008F BODY MASS INDEX DOCD: CPT | Performed by: NURSE PRACTITIONER

## 2023-03-28 RX ORDER — PREDNISONE 20 MG/1
40 TABLET ORAL DAILY
Qty: 10 TABLET | Refills: 0 | Status: SHIPPED | OUTPATIENT
Start: 2023-03-28 | End: 2023-04-02

## 2023-04-24 ENCOUNTER — OFFICE VISIT (OUTPATIENT)
Dept: FAMILY MEDICINE CLINIC | Facility: CLINIC | Age: 33
End: 2023-04-24
Payer: COMMERCIAL

## 2023-04-24 VITALS
BODY MASS INDEX: 26.58 KG/M2 | HEIGHT: 63 IN | WEIGHT: 150 LBS | RESPIRATION RATE: 16 BRPM | DIASTOLIC BLOOD PRESSURE: 77 MMHG | SYSTOLIC BLOOD PRESSURE: 119 MMHG | HEART RATE: 60 BPM | OXYGEN SATURATION: 99 %

## 2023-04-24 DIAGNOSIS — J02.9 SORE THROAT: Primary | ICD-10-CM

## 2023-04-24 DIAGNOSIS — Z20.818 EXPOSURE TO STREP THROAT: ICD-10-CM

## 2023-04-24 LAB
CONTROL LINE PRESENT WITH A CLEAR BACKGROUND (YES/NO): YES YES/NO
KIT LOT #: NORMAL NUMERIC

## 2023-04-24 PROCEDURE — 87081 CULTURE SCREEN ONLY: CPT | Performed by: PHYSICIAN ASSISTANT

## 2023-04-24 PROCEDURE — 3008F BODY MASS INDEX DOCD: CPT | Performed by: PHYSICIAN ASSISTANT

## 2023-04-24 PROCEDURE — 99213 OFFICE O/P EST LOW 20 MIN: CPT | Performed by: PHYSICIAN ASSISTANT

## 2023-04-24 PROCEDURE — 3078F DIAST BP <80 MM HG: CPT | Performed by: PHYSICIAN ASSISTANT

## 2023-04-24 PROCEDURE — 3074F SYST BP LT 130 MM HG: CPT | Performed by: PHYSICIAN ASSISTANT

## 2023-04-24 PROCEDURE — 87880 STREP A ASSAY W/OPTIC: CPT | Performed by: PHYSICIAN ASSISTANT

## 2023-04-24 NOTE — PATIENT INSTRUCTIONS
-Push fluids  -Cool mist humidifier  -Tylenol/motrin as needed  -Must be seen with worsening symptoms.

## 2023-12-07 ENCOUNTER — OFFICE VISIT (OUTPATIENT)
Facility: CLINIC | Age: 33
End: 2023-12-07
Payer: COMMERCIAL

## 2023-12-07 ENCOUNTER — TELEPHONE (OUTPATIENT)
Facility: CLINIC | Age: 33
End: 2023-12-07

## 2023-12-07 VITALS
BODY MASS INDEX: 28.35 KG/M2 | HEART RATE: 87 BPM | DIASTOLIC BLOOD PRESSURE: 70 MMHG | SYSTOLIC BLOOD PRESSURE: 112 MMHG | WEIGHT: 160 LBS | HEIGHT: 63 IN

## 2023-12-07 DIAGNOSIS — Z12.31 ENCOUNTER FOR MAMMOGRAM TO ESTABLISH BASELINE MAMMOGRAM: Primary | ICD-10-CM

## 2023-12-07 DIAGNOSIS — Z01.419 WELL WOMAN EXAM WITH ROUTINE GYNECOLOGICAL EXAM: ICD-10-CM

## 2023-12-07 PROCEDURE — 3078F DIAST BP <80 MM HG: CPT | Performed by: OBSTETRICS & GYNECOLOGY

## 2023-12-07 PROCEDURE — 3008F BODY MASS INDEX DOCD: CPT | Performed by: OBSTETRICS & GYNECOLOGY

## 2023-12-07 PROCEDURE — 99395 PREV VISIT EST AGE 18-39: CPT | Performed by: OBSTETRICS & GYNECOLOGY

## 2023-12-07 PROCEDURE — 3074F SYST BP LT 130 MM HG: CPT | Performed by: OBSTETRICS & GYNECOLOGY

## 2023-12-07 NOTE — PROGRESS NOTES
Shana Carmona is a 26year old female [id number] Patient's last menstrual period was 2023 (exact date). Chief Complaint   Patient presents with    Wellness Visit   . He has a period every month minimal cramping on the middle of her. She takes Motrin. No bleeding between. Her  has a vasectomy. She did not have gestational diabetes has not had blood done for a while we will draw her blood work. She had 2 maternal aunts with breast cancer postmenopausal.  She desires to have genetic cancer screening. Mammogram will be ordered.     OBSTETRICS HISTORY:  OB History    Para Term  AB Living   4 3 3   1 3   SAB IAB Ectopic Multiple Live Births   1     0 3      # Outcome Date GA Lbr Carmine/2nd Weight Sex Delivery Anes PTL Lv   4 Term 10/17/19 39w2d 03:27 / 00:15 6 lb 15.3 oz (3.155 kg) M NORMAL SPONT EPI N NICOLE      Complications: Group B beta strep +   3 Term 17 38w6d 05:26 / 00:05 6 lb 10.7 oz (3.025 kg) F NORMAL SPONT EPI N NICOLE   2 Term 16 40w3d 05:02 / 01:18 6 lb 7 oz (2.92 kg) F NORMAL SPONT EPI N NICOLE      Complications: Chorioamnionitis, Temp 100.4 or greater, Decreased FHR variability, Variable decelerations   1 SAB 08/22/15     SAB         Obstetric Comments   Chorioamnionitis on placenta   infarct       GYNE HISTORY:  Periods regular monthly    History   Sexual Activity    Sexual activity: Yes    Partners: Male    Birth control/ protection: Vasectomy        Pap Date: 20  Pap Result Notes: neg/neg        MEDICAL HISTORY:  Past Medical History:   Diagnosis Date    Anemia     Asthma     childhood    Pap smear for cervical cancer screening 2015    negative       SURGICAL HISTORY:  Past Surgical History:   Procedure Laterality Date    Oral surgery  2002    wisdom teeth       SOCIAL HISTORY:  Social History     Socioeconomic History    Marital status:      Spouse name: Not on file    Number of children: Not on file    Years of education: Not on file Highest education level: Not on file   Occupational History    Not on file   Tobacco Use    Smoking status: Never    Smokeless tobacco: Never   Vaping Use    Vaping Use: Never used   Substance and Sexual Activity    Alcohol use: Yes    Drug use: No    Sexual activity: Yes     Partners: Male     Birth control/protection: Vasectomy   Other Topics Concern     Service Not Asked    Blood Transfusions Not Asked    Caffeine Concern No     Comment: coffee    Occupational Exposure Not Asked    Hobby Hazards Not Asked    Sleep Concern Not Asked    Stress Concern Not Asked    Weight Concern Not Asked    Special Diet Not Asked    Back Care Not Asked    Exercise Yes     Comment: 2 d/wk    Bike Helmet Not Asked    Seat Belt Yes    Self-Exams Not Asked   Social History Narrative    Not on file     Social Determinants of Health     Financial Resource Strain: Not on file   Food Insecurity: Not on file   Transportation Needs: Not on file   Physical Activity: Not on file   Stress: Not on file   Social Connections: Not on file   Housing Stability: Not on file       FAMILY HISTORY:  Family History   Problem Relation Age of Onset    No Known Problems Father     No Known Problems Mother     No Known Problems Maternal Grandmother     Cancer Maternal Grandfather         lung    Breast Cancer Paternal Grandmother 61    No Known Problems Paternal Grandfather     Breast Cancer Maternal Aunt         breast cancer    Breast Cancer Maternal Aunt 76        breast cancer       MEDICATIONS:  No current outpatient medications on file. ALLERGIES:  No Known Allergies      Review of Systems:  Constitutional:  Denies fatigue, night sweats, hot flashes  Gastrointestinal:  denies heartburn, abdominal pain, diarrhea or constipation  Genitourinary:  denies dysuria, incontinence, abnormal vaginal discharge, vaginal itching  Skin/Breast:  Denies any breast pain, lumps, or discharge.    Neurological:  denies headaches, extremity weakness or numbness. PHYSICAL EXAM:   Constitutional: well developed, well nourished  Head/Face: normocephalic  Neck/Thyroid: thyroid symmetric, no thyromegaly, no nodules, no adenopathy  Breast: normal without palpable masses, tenderness, asymmetry, nipple discharge, nipple retraction or skin changes  Abdomen:  soft, nontender, nondistended, no masses  Skin/Hair: no unusual rashes or bruises   Extremities: no edema, no cyanosis  Psychiatric:  Oriented to time, place, person and situation. Appropriate mood and affect    Pelvic Exam:  External Genitalia: normal appearance, hair distribution, and no lesions  Urethral Meatus:  normal in size, location, without lesions and prolapse  Bladder:  No fullness, masses or tenderness  Vagina:  Normal appearance without lesions, no abnormal discharge  Cervix:  Normal without tenderness on motion without lesions. Uterus: normal in size, contour, position, mobility, without tenderness  Adnexa: normal without masses or tenderness  Perineum: normal  Anus: no hemorroids     Assessment & Plan:  There are no diagnoses linked to this encounter. Well woman exam.  Mammogram.  Cancer screening.

## 2023-12-07 NOTE — TELEPHONE ENCOUNTER
Pt had 2 maternal aunts with breast cancer and 1 paternal grandmother. Pt request for cancer screen lab draw per      Patients name &  verified on lab tubes with patient. Cancer screen lab drawn, patient tolerated well. Specimen placed at . INVITAE access, call in 4 weeks for result, discussed. Patient verbalized understanding.

## 2024-01-17 ENCOUNTER — HOSPITAL ENCOUNTER (OUTPATIENT)
Dept: MAMMOGRAPHY | Age: 34
Discharge: HOME | End: 2024-01-17
Attending: OBSTETRICS & GYNECOLOGY
Payer: COMMERCIAL

## 2024-01-17 DIAGNOSIS — Z12.31 ENCOUNTER FOR MAMMOGRAM TO ESTABLISH BASELINE MAMMOGRAM: ICD-10-CM

## 2024-01-17 PROCEDURE — 77067 SCR MAMMO BI INCL CAD: CPT | Performed by: OBSTETRICS & GYNECOLOGY

## 2024-01-17 PROCEDURE — 77063 BREAST TOMOSYNTHESIS BI: CPT | Performed by: OBSTETRICS & GYNECOLOGY

## 2024-05-24 LAB
ABSOLUTE BASOPHILS: 29 CELLS/UL (ref 0–200)
ABSOLUTE EOSINOPHILS: 70 CELLS/UL (ref 15–500)
ABSOLUTE LYMPHOCYTES: 1740 CELLS/UL (ref 850–3900)
ABSOLUTE MONOCYTES: 400 CELLS/UL (ref 200–950)
ABSOLUTE NEUTROPHILS: 3561 CELLS/UL (ref 1500–7800)
ALBUMIN/GLOBULIN RATIO: 1.6 (CALC) (ref 1–2.5)
ALBUMIN: 4.4 G/DL (ref 3.6–5.1)
ALKALINE PHOSPHATASE: 47 U/L (ref 31–125)
ALT: 12 U/L (ref 6–29)
AST: 14 U/L (ref 10–30)
BASOPHILS: 0.5 %
BILIRUBIN, TOTAL: 0.4 MG/DL (ref 0.2–1.2)
BUN: 13 MG/DL (ref 7–25)
CALCIUM: 9.4 MG/DL (ref 8.6–10.2)
CARBON DIOXIDE: 29 MMOL/L (ref 20–32)
CHLORIDE: 102 MMOL/L (ref 98–110)
CHOL/HDLC RATIO: 3.9 (CALC)
CHOLESTEROL, TOTAL: 233 MG/DL
CREATININE: 0.71 MG/DL (ref 0.5–0.97)
EGFR: 113 ML/MIN/1.73M2
EOSINOPHILS: 1.2 %
GLOBULIN: 2.7 G/DL (CALC) (ref 1.9–3.7)
GLUCOSE: 80 MG/DL (ref 65–99)
HDL CHOLESTEROL: 60 MG/DL
HEMATOCRIT: 42.7 % (ref 35–45)
HEMOGLOBIN: 13.8 G/DL (ref 11.7–15.5)
LDL-CHOLESTEROL: 155 MG/DL (CALC)
LYMPHOCYTES: 30 %
MCH: 30.1 PG (ref 27–33)
MCHC: 32.3 G/DL (ref 32–36)
MCV: 93 FL (ref 80–100)
MONOCYTES: 6.9 %
MPV: 9.9 FL (ref 7.5–12.5)
NEUTROPHILS: 61.4 %
NON-HDL CHOLESTEROL: 173 MG/DL (CALC)
PLATELET COUNT: 267 THOUSAND/UL (ref 140–400)
POTASSIUM: 4.5 MMOL/L (ref 3.5–5.3)
PROTEIN, TOTAL: 7.1 G/DL (ref 6.1–8.1)
RDW: 12.3 % (ref 11–15)
RED BLOOD CELL COUNT: 4.59 MILLION/UL (ref 3.8–5.1)
SODIUM: 137 MMOL/L (ref 135–146)
TRIGLYCERIDES: 81 MG/DL
TSH: 1.72 MIU/L
WHITE BLOOD CELL COUNT: 5.8 THOUSAND/UL (ref 3.8–10.8)

## 2024-12-17 ENCOUNTER — PATIENT MESSAGE (OUTPATIENT)
Facility: CLINIC | Age: 34
End: 2024-12-17

## 2024-12-17 DIAGNOSIS — Z12.31 ENCOUNTER FOR SCREENING MAMMOGRAM FOR MALIGNANT NEOPLASM OF BREAST: Primary | ICD-10-CM

## 2025-01-20 ENCOUNTER — HOSPITAL ENCOUNTER (OUTPATIENT)
Dept: MAMMOGRAPHY | Age: 35
Discharge: HOME | End: 2025-01-20
Attending: OBSTETRICS & GYNECOLOGY
Payer: COMMERCIAL

## 2025-01-20 DIAGNOSIS — Z12.31 ENCOUNTER FOR SCREENING MAMMOGRAM FOR MALIGNANT NEOPLASM OF BREAST: ICD-10-CM

## 2025-01-20 PROCEDURE — 77067 SCR MAMMO BI INCL CAD: CPT | Performed by: OBSTETRICS & GYNECOLOGY

## 2025-01-20 PROCEDURE — 77063 BREAST TOMOSYNTHESIS BI: CPT | Performed by: OBSTETRICS & GYNECOLOGY

## 2025-02-17 ENCOUNTER — OFFICE VISIT (OUTPATIENT)
Dept: FAMILY MEDICINE CLINIC | Facility: CLINIC | Age: 35
End: 2025-02-17
Payer: COMMERCIAL

## 2025-02-17 VITALS
WEIGHT: 155 LBS | DIASTOLIC BLOOD PRESSURE: 84 MMHG | HEART RATE: 108 BPM | SYSTOLIC BLOOD PRESSURE: 136 MMHG | HEIGHT: 63 IN | TEMPERATURE: 100 F | RESPIRATION RATE: 16 BRPM | OXYGEN SATURATION: 96 % | BODY MASS INDEX: 27.46 KG/M2

## 2025-02-17 DIAGNOSIS — J02.9 SORE THROAT: ICD-10-CM

## 2025-02-17 DIAGNOSIS — R68.89 FLU-LIKE SYMPTOMS: Primary | ICD-10-CM

## 2025-02-17 LAB
CONTROL LINE PRESENT WITH A CLEAR BACKGROUND (YES/NO): YES YES/NO
KIT LOT #: NORMAL NUMERIC

## 2025-02-17 PROCEDURE — 87637 SARSCOV2&INF A&B&RSV AMP PRB: CPT

## 2025-02-17 RX ORDER — OSELTAMIVIR PHOSPHATE 75 MG/1
75 CAPSULE ORAL 2 TIMES DAILY
Qty: 10 CAPSULE | Refills: 0 | Status: SHIPPED | OUTPATIENT
Start: 2025-02-17 | End: 2025-02-22

## 2025-02-17 NOTE — PROGRESS NOTES
Subjective:   Patient ID: Trish Silverio is a 35 year old female.    Patient presents to clinic with complaints of fever up to 103, chills, headache, body aches and sore throat that started this morning. Denies known exposures. Taking ibuprofen for symptoms.    Sore Throat   This is a new problem. The current episode started today. The problem has been unchanged. Neither side of throat is experiencing more pain than the other. The maximum temperature recorded prior to her arrival was 103 - 104 F. Associated symptoms include headaches. Pertinent negatives include no shortness of breath. She has had no exposure to strep. She has tried NSAIDs for the symptoms.       History/Other:   Review of Systems   Constitutional:  Positive for chills and fever.   HENT:  Positive for sore throat.    Respiratory:  Negative for shortness of breath.    Musculoskeletal:  Positive for myalgias.   Neurological:  Positive for headaches.   All other systems reviewed and are negative.    Current Outpatient Medications   Medication Sig Dispense Refill    oseltamivir 75 MG Oral Cap Take 1 capsule (75 mg total) by mouth 2 (two) times daily for 5 days. 10 capsule 0     Allergies:Allergies[1]    Objective:   Physical Exam  Vitals reviewed.   Constitutional:       General: She is not in acute distress.     Appearance: Normal appearance. She is not ill-appearing or toxic-appearing.   HENT:      Head: Normocephalic and atraumatic.      Right Ear: Tympanic membrane, ear canal and external ear normal.      Left Ear: Tympanic membrane, ear canal and external ear normal.      Nose: Nose normal.      Mouth/Throat:      Mouth: Mucous membranes are moist.      Pharynx: Oropharynx is clear. No posterior oropharyngeal erythema.   Cardiovascular:      Rate and Rhythm: Normal rate and regular rhythm.      Pulses: Normal pulses.      Heart sounds: Normal heart sounds.   Pulmonary:      Effort: Pulmonary effort is normal. No respiratory distress.      Breath  sounds: Normal breath sounds. No wheezing, rhonchi or rales.   Musculoskeletal:         General: Normal range of motion.      Cervical back: Normal range of motion and neck supple.   Lymphadenopathy:      Cervical: No cervical adenopathy.   Skin:     General: Skin is warm and dry.      Capillary Refill: Capillary refill takes less than 2 seconds.   Neurological:      General: No focal deficit present.      Mental Status: She is alert and oriented to person, place, and time.   Psychiatric:         Mood and Affect: Mood normal.         Behavior: Behavior normal.         Assessment & Plan:   1. Flu-like symptoms    2. Sore throat        Orders Placed This Encounter   Procedures    Strep A Assay W/Optic    SARS-CoV-2/Flu A and B/RSV by PCR ([facility])     Results for orders placed or performed in visit on 02/17/25   Strep A Assay W/Optic    Collection Time: 02/17/25  5:45 PM   Result Value Ref Range    Strep Grp A Screen neg Negative    Control Line Present with a clear background (yes/no) yes Yes/No    Kit Lot # [id number] Numeric    Kit Expiration Date 12/20/25 Date     Quad sent. Will start tamiflu and stop if negative. Would like paxlovid if COVID positive. Discussion about supportive treatment including over the counter medications, hydration and rest.    Meds This Visit:  Requested Prescriptions     Signed Prescriptions Disp Refills    oseltamivir 75 MG Oral Cap 10 capsule 0     Sig: Take 1 capsule (75 mg total) by mouth 2 (two) times daily for 5 days.       Imaging & Referrals:  None         [1] No Known Allergies

## 2025-02-18 LAB
FLUAV + FLUBV RNA SPEC NAA+PROBE: NOT DETECTED
FLUAV + FLUBV RNA SPEC NAA+PROBE: NOT DETECTED
RSV RNA SPEC NAA+PROBE: NOT DETECTED
SARS-COV-2 RNA RESP QL NAA+PROBE: NOT DETECTED

## (undated) NOTE — LETTER
Dear new mom:    We've missed you! The nurses of [facility] have tried to reach you by phone to ask if you had any questions regarding your health or the care of your new little one.     Please feel free to call your doctor with an

## (undated) NOTE — ED AVS SNAPSHOT
[facility] Emergency Department  [place] [address]  Phone:  [phone]  Fax:  2021 Benjamin Ly   MRN: [id number]    Department:  [facility] Emergency Department   Date of Visit:  6/27/2017 IF THERE IS ANY CHANGE OR WORSENING OF YOUR CONDITION, CALL YOUR PRIMARY CARE PHYSICIAN AT ONCE OR RETURN IMMEDIATELY TO THE EMERGENCY DEPARTMENT.     If you have been prescribed any medication(s), please fill your prescription right away and begin taking t

## (undated) NOTE — MR AVS SNAPSHOT
Oneil Shay  [address] [facility], [facility] [address]  [address] [place] [phone]               Thank you for choosing us for your health care visit with [facility], DO.   We are glad to serve you and happy to provide you with this Take 1 capsule by mouth daily. MyChart     Visit MyChart  You can access your MyChart to more actively manage your health care and view more details from this visit by going to [email or web address].   If you've recently had a stay

## (undated) NOTE — MR AVS SNAPSHOT
Oneil Shay  [address], [facility] 100  [address] [place] [phone]               Thank you for choosing us for your health care visit with uLis A Barrientos DO.   We are glad to serve you and happy to provide you with this discharge instructions in [employer] by going to Visits < Admission Summaries. If you've been to the Emergency Department or your doctor's office, you can view your past visit information in [employer] by going to Visits < Visit Summaries. Second [employer] Playbook questions?

## (undated) NOTE — MR AVS SNAPSHOT
Oneil Shay  [address] [place] [phone]               Thank you for choosing us for your health care visit with Junior Adams MD.  We are glad to serve you and happy to provide you with this sum care, subsequent pregnancy, first trimester [Z34.81]           Urine Dip in office [42541]    Complete by:  As directed    Assoc Dx:  Prenatal care, subsequent pregnancy, first trimester [Z34.81], Less than 8 weeks gestation of pregnancy [R1S.06] Ranges with normal pregnancies:   0.2- 1 week 5.0 50.0 mIU/mL   1- 2 weeks  50.0-500.0 mIU/mL   2- 3 weeks  100-5,000 mIU/mL   3- 4 weeks  500- 10,000 mIU/mL   4- 5 weeks  1,000- 50,000 mIU/mL   5- 6 weeks  10,000- 100,000 mIU/mL   6- 8 weeks  15,000- 200,

## (undated) NOTE — MR AVS SNAPSHOT
Oneil [address], [address]  [address] [place] [phone]               Thank you for choosing us for your health care visit with [facility], DO.   We are glad to serve you and happy to provide you with this This list is accurate as of: 6/14/17  4:45 PM.  Always use your most recent med list.                doxylamine-pyridoxine 10-10 MG Tbec   Take 2 tablets by mouth nightly.    Commonly known as:  DICLEGIS           prenatal multivitamin plus DHA 27-0.8-228 M

## (undated) NOTE — Clinical Note
2017      Sung Silverio        :  1990        To Whom It May Concern:    Devonte Currie  has recently been treated for a medical condition.  At this time, I have determined she may return to [place] [id number] SCHOOL:[id number]} effective ***, {EMG

## (undated) NOTE — MR AVS SNAPSHOT
After Visit Summary   6/16/2020    Verónica Silverio    MRN: [id number]           Visit Information     Date & Time  6/16/2020 11:45 AM Provider  DO Alessandro Bourgeois  [address]  Dept[address]  Phone  [phone]      Your Vi If you receive a survey from [employer], please take a few minutes to complete it and provide feedback. We strive to deliver the best patient experience and are looking for ways to make improvements. Your feedback will help us do so.  For more information EMERGENCY ROOM Life-threatening emergencies needing immediate intervention at a hospital emergency room.  Average cost  $2,300*   *Cost varies based on your insurance coverage  For more information about hours, locations or appointment options available at

## (undated) NOTE — MR AVS SNAPSHOT
Oneil Shay  [address], [facility] 100  [address] [place] [phone]               Thank you for choosing us for your health care visit with [facility], DO.   We are glad to serve you and happy to provide you with this US OB ABD APPROACH 1ST TRIMESTER <14 WEKS EMG ONLY             MyChart     Visit MyChart  You can access your MyChart to more actively manage your health care and view more details from this visit by going to [email or web address].   If you've recen

## (undated) NOTE — MR AVS SNAPSHOT
After Visit Summary   3/17/2017    Arelis Silverio    MRN: [id number]           Visit Information        Provider Department Dept Phone    3/17/2017  9:15 AM Booker Perrin MD [facility]s [phone]      Your Vitals Were     BP Pulse Temp(Src 3/17/2017 (Approximate) 3/17/2018    VAGINITIS/VAGINOSIS, DNA PROBE [[id number] CUSTOM]  3/17/2017 (Approximate) 3/17/2018      Future Appointments        Provider Department    3/22/2017 2:30 PM [facility]     3/22/2017